# Patient Record
Sex: MALE | Race: ASIAN | NOT HISPANIC OR LATINO | URBAN - METROPOLITAN AREA
[De-identification: names, ages, dates, MRNs, and addresses within clinical notes are randomized per-mention and may not be internally consistent; named-entity substitution may affect disease eponyms.]

---

## 2018-08-11 ENCOUNTER — EMERGENCY (EMERGENCY)
Facility: HOSPITAL | Age: 58
LOS: 1 days | Discharge: HOME | End: 2018-08-11
Attending: EMERGENCY MEDICINE | Admitting: EMERGENCY MEDICINE
Payer: COMMERCIAL

## 2018-08-11 VITALS
DIASTOLIC BLOOD PRESSURE: 79 MMHG | RESPIRATION RATE: 18 BRPM | HEART RATE: 81 BPM | OXYGEN SATURATION: 98 % | TEMPERATURE: 98 F | SYSTOLIC BLOOD PRESSURE: 156 MMHG

## 2018-08-11 LAB
ALBUMIN SERPL ELPH-MCNC: 4.2 G/DL — SIGNIFICANT CHANGE UP (ref 3.3–5)
ALP SERPL-CCNC: 78 U/L — SIGNIFICANT CHANGE UP (ref 40–120)
ALT FLD-CCNC: 28 U/L — SIGNIFICANT CHANGE UP (ref 4–41)
AST SERPL-CCNC: 21 U/L — SIGNIFICANT CHANGE UP (ref 4–40)
BASE EXCESS BLDV CALC-SCNC: -0.8 MMOL/L — SIGNIFICANT CHANGE UP
BASOPHILS # BLD AUTO: 0.1 K/UL — SIGNIFICANT CHANGE UP (ref 0–0.2)
BASOPHILS NFR BLD AUTO: 1.2 % — SIGNIFICANT CHANGE UP (ref 0–2)
BILIRUB SERPL-MCNC: 0.2 MG/DL — SIGNIFICANT CHANGE UP (ref 0.2–1.2)
BLOOD GAS VENOUS - CREATININE: 1.01 MG/DL — SIGNIFICANT CHANGE UP (ref 0.5–1.3)
BUN SERPL-MCNC: 22 MG/DL — SIGNIFICANT CHANGE UP (ref 7–23)
CALCIUM SERPL-MCNC: 9.5 MG/DL — SIGNIFICANT CHANGE UP (ref 8.4–10.5)
CHLORIDE BLDV-SCNC: 112 MMOL/L — HIGH (ref 96–108)
CHLORIDE SERPL-SCNC: 104 MMOL/L — SIGNIFICANT CHANGE UP (ref 98–107)
CO2 SERPL-SCNC: 20 MMOL/L — LOW (ref 22–31)
CREAT SERPL-MCNC: 1.07 MG/DL — SIGNIFICANT CHANGE UP (ref 0.5–1.3)
EOSINOPHIL # BLD AUTO: 0.35 K/UL — SIGNIFICANT CHANGE UP (ref 0–0.5)
EOSINOPHIL NFR BLD AUTO: 4.2 % — SIGNIFICANT CHANGE UP (ref 0–6)
GAS PNL BLDV: 138 MMOL/L — SIGNIFICANT CHANGE UP (ref 136–146)
GLUCOSE BLDV-MCNC: 101 — HIGH (ref 70–99)
GLUCOSE SERPL-MCNC: 102 MG/DL — HIGH (ref 70–99)
HCO3 BLDV-SCNC: 23 MMOL/L — SIGNIFICANT CHANGE UP (ref 20–27)
HCT VFR BLD CALC: 43.7 % — SIGNIFICANT CHANGE UP (ref 39–50)
HCT VFR BLDV CALC: 46.8 % — SIGNIFICANT CHANGE UP (ref 39–51)
HGB BLD-MCNC: 14.8 G/DL — SIGNIFICANT CHANGE UP (ref 13–17)
HGB BLDV-MCNC: 15.3 G/DL — SIGNIFICANT CHANGE UP (ref 13–17)
IMM GRANULOCYTES # BLD AUTO: 0.03 # — SIGNIFICANT CHANGE UP
IMM GRANULOCYTES NFR BLD AUTO: 0.4 % — SIGNIFICANT CHANGE UP (ref 0–1.5)
LACTATE BLDV-MCNC: 1.2 MMOL/L — SIGNIFICANT CHANGE UP (ref 0.5–2)
LYMPHOCYTES # BLD AUTO: 2.9 K/UL — SIGNIFICANT CHANGE UP (ref 1–3.3)
LYMPHOCYTES # BLD AUTO: 35.1 % — SIGNIFICANT CHANGE UP (ref 13–44)
MCHC RBC-ENTMCNC: 29.4 PG — SIGNIFICANT CHANGE UP (ref 27–34)
MCHC RBC-ENTMCNC: 33.9 % — SIGNIFICANT CHANGE UP (ref 32–36)
MCV RBC AUTO: 86.9 FL — SIGNIFICANT CHANGE UP (ref 80–100)
MONOCYTES # BLD AUTO: 0.65 K/UL — SIGNIFICANT CHANGE UP (ref 0–0.9)
MONOCYTES NFR BLD AUTO: 7.9 % — SIGNIFICANT CHANGE UP (ref 2–14)
NEUTROPHILS # BLD AUTO: 4.23 K/UL — SIGNIFICANT CHANGE UP (ref 1.8–7.4)
NEUTROPHILS NFR BLD AUTO: 51.2 % — SIGNIFICANT CHANGE UP (ref 43–77)
NRBC # FLD: 0 — SIGNIFICANT CHANGE UP
PCO2 BLDV: 44 MMHG — SIGNIFICANT CHANGE UP (ref 41–51)
PH BLDV: 7.36 PH — SIGNIFICANT CHANGE UP (ref 7.32–7.43)
PLATELET # BLD AUTO: 251 K/UL — SIGNIFICANT CHANGE UP (ref 150–400)
PMV BLD: 9.7 FL — SIGNIFICANT CHANGE UP (ref 7–13)
PO2 BLDV: 62 MMHG — HIGH (ref 35–40)
POTASSIUM BLDV-SCNC: 4.1 MMOL/L — SIGNIFICANT CHANGE UP (ref 3.4–4.5)
POTASSIUM SERPL-MCNC: 4.3 MMOL/L — SIGNIFICANT CHANGE UP (ref 3.5–5.3)
POTASSIUM SERPL-SCNC: 4.3 MMOL/L — SIGNIFICANT CHANGE UP (ref 3.5–5.3)
PROT SERPL-MCNC: 8 G/DL — SIGNIFICANT CHANGE UP (ref 6–8.3)
RBC # BLD: 5.03 M/UL — SIGNIFICANT CHANGE UP (ref 4.2–5.8)
RBC # FLD: 12.6 % — SIGNIFICANT CHANGE UP (ref 10.3–14.5)
SAO2 % BLDV: 90.7 % — HIGH (ref 60–85)
SODIUM SERPL-SCNC: 139 MMOL/L — SIGNIFICANT CHANGE UP (ref 135–145)
WBC # BLD: 8.26 K/UL — SIGNIFICANT CHANGE UP (ref 3.8–10.5)
WBC # FLD AUTO: 8.26 K/UL — SIGNIFICANT CHANGE UP (ref 3.8–10.5)

## 2018-08-11 PROCEDURE — 99219: CPT

## 2018-08-11 PROCEDURE — 73140 X-RAY EXAM OF FINGER(S): CPT | Mod: 26,RT

## 2018-08-11 RX ORDER — LISINOPRIL 2.5 MG/1
20 TABLET ORAL DAILY
Qty: 0 | Refills: 0 | Status: DISCONTINUED | OUTPATIENT
Start: 2018-08-11 | End: 2018-08-15

## 2018-08-11 RX ORDER — KETOROLAC TROMETHAMINE 30 MG/ML
30 SYRINGE (ML) INJECTION ONCE
Qty: 0 | Refills: 0 | Status: DISCONTINUED | OUTPATIENT
Start: 2018-08-11 | End: 2018-08-11

## 2018-08-11 RX ORDER — LISINOPRIL 2.5 MG/1
10 TABLET ORAL ONCE
Qty: 0 | Refills: 0 | Status: COMPLETED | OUTPATIENT
Start: 2018-08-11 | End: 2018-08-11

## 2018-08-11 RX ADMIN — Medication 600 MILLIGRAM(S): at 16:00

## 2018-08-11 RX ADMIN — LISINOPRIL 10 MILLIGRAM(S): 2.5 TABLET ORAL at 21:21

## 2018-08-11 RX ADMIN — Medication 30 MILLIGRAM(S): at 21:21

## 2018-08-11 RX ADMIN — Medication 30 MILLIGRAM(S): at 22:00

## 2018-08-11 RX ADMIN — Medication 100 MILLIGRAM(S): at 22:54

## 2018-08-11 RX ADMIN — Medication 100 MILLIGRAM(S): at 15:30

## 2018-08-11 NOTE — ED CDU PROVIDER INITIAL DAY NOTE - OBJECTIVE STATEMENT
57 y.o male pmhx of HTN here with swelling, pain and discoloration to R 3rd digit s/p laceration repair 3 weeks ago. Pt injured his finger while at work when one of the lift ramps dropped on his hand- laceration requiring 7 stiches and also had acute tuft fracture. Pt had sutures removed 7/5- went to workers comp doctor yesterday who started him on augmentin and said it was infected. In the ED,  xray without osteo- hand was called for consult. Sent to CDU for IX abx and hand.

## 2018-08-11 NOTE — ED CDU PROVIDER INITIAL DAY NOTE - ATTENDING CONTRIBUTION TO CARE
Pt was seen and evaluated by me. Pt is a 56 y/o male with PMHx of HTN presenting to the ED for worsening swelling and pain to right 3rd finger. Pt states 3 wks ago he got injured at work when a lift ramp dropped on his left hand. Pt was seen at [place] and noted to have a comminuted tuft fracture to the right 3rd finger with a laceration that was sutured. Pt had sutures removed at [place] on 7/5 and followed up with his worker's comp physician who started pt on Augmentin and sent pt in for concern for infection. Pt admits to pain to the tip of the right 3rd finger with difficulty bending it. Pt denies any fever, chills, nausea, vomiting, SOB, chest pain, or abd pain. Pt was seen in the ED and started on IV clinda. Pt was sent to OBS to continue IV antibiotics and Hand consult. Lungs CTA b/l. RRR. Abd soft, non-tender. Noted to have swelling with ecchymosis to tip of right 3rd finger with difficulty in flexing. + distal pulses.

## 2018-08-11 NOTE — ED PROVIDER NOTE - PHYSICAL EXAMINATION
Rt. middle finger, open wound, infected with discharge. Rt. middle finger wound, infected with discharge.

## 2018-08-11 NOTE — PROCEDURE NOTE - NSEQUIPUSED_SKIN_A_CORE
gloves/antiseptic cleaning solution/gauze pads/normal saline solution/povidone-iodine pads/forceps/adhesive tape

## 2018-08-11 NOTE — ED PROVIDER NOTE - MEDICAL DECISION MAKING DETAILS
57M presenting with rt. finger infection s/p lac repaired 6 weeks ago, started on Doxy yesterday. Endorsed to pain and discharge, no fever, no loss of sensation - can range. Will get Xray to r/o osteo, Hand f/u and Abx (Augmentin vs. IV) and reassess.

## 2018-08-11 NOTE — ED ADULT NURSE NOTE - OBJECTIVE STATEMENT
Pt received a&ox3, c/o pain/swelling to third digit on right hand s/p lac 1.5 months ago, pt denies fevers/chills, appears comfortable, ambulatory, NAD, MD eval performed, 22 gauge IV placed in left hand, labs drawn and sent.

## 2018-08-11 NOTE — ED CDU PROVIDER INITIAL DAY NOTE - RESPIRATORY NEGATIVE STATEMENT, MLM
Unclear exact etiology.  Given persistence will refer to general surgery to consult for removal.  Tetanus updated today, let me know if you change your mind about flu vaccine.  Electronically Signed By: Annemarie Mcduffie PA-C     no chest pain, no cough, and no shortness of breath.

## 2018-08-11 NOTE — CONSULT NOTE ADULT - SUBJECTIVE AND OBJECTIVE BOX
58 yo RHD M 6 weeks s/p crush to right middle finger with tuft fracture and laceration.  Seen and treated at OSH.  Seen by private doctor yesterday and diagnosed with "infection".  Started on doxycyline.  Here today for pain.    PMH/PSH: reviewed  NKDA  Meds: doxycyline  Soc: RHD, works at [employer], nonsmoker  Fam: noncontrib    ROS: as per HPI    PE:  NAD  Alert  MMM  PERRL  Right long finger with mild swelling at distal aspect  Tender to palpation at distal tuft  Very stiff at DIPJ  Finger very dirty with dried blood, dead skin, subungal old blood  Appears neglected    xray: nonunion of middle finger tuft fracture    A/P: 58 yo RHD M 6 weeks after RMF crush.    diagnostic I&D performed - no purulence found.  see procedure note.  Finger aggressively cleaned  Recommend hand therapy for stiffness  Follow up 1 week for wound check    Tia Valencia MD  Plastic Surgery

## 2018-08-11 NOTE — ED CDU PROVIDER INITIAL DAY NOTE - PHYSICAL EXAMINATION
R 3rd digit with distal tip swelling/discoloration and tenderness to palpation, no crepitus   foul smelling  no active drainage  pulses intact, compartments soft, no streaking up arm

## 2018-08-11 NOTE — ED ADULT NURSE NOTE - CHIEF COMPLAINT QUOTE
Laceration to right hand 3rd finger 6/2/18.  [facility], f/u with mayi and saw hand specialist yesterday.  As per pt hand specialist opened wound and instructed pt to come to ED, secondary to infection.

## 2018-08-11 NOTE — ED PROVIDER NOTE - OBJECTIVE STATEMENT
57M presenting with finger infection s/p lac June 24th. Pt was sutured for rt. middle finger lact at [facility] and was told that it was healing well. Pt was seen by a worker [facility] doctor yesterday and was told that it has an infection. Pt was started on Doxy yesterday.

## 2018-08-11 NOTE — ED ADULT TRIAGE NOTE - CHIEF COMPLAINT QUOTE
Laceration to right hand 3rd finger 6/2/18.  [place] [facility], f/u with mayi and saw hand specialist yesterday.  As per pt hand specialist opened wound and instructed pt to come to ED, secondary to infection.

## 2018-08-11 NOTE — ED ADULT NURSE NOTE - NSIMPLEMENTINTERV_GEN_ALL_ED
Implemented All Universal Safety Interventions:  [place] to call system. Call bell, personal items and telephone within reach. Instruct patient to call for assistance. Room bathroom lighting operational. Non-slip footwear when patient is off stretcher. Physically safe environment: no spills, clutter or unnecessary equipment. Stretcher in lowest position, wheels locked, appropriate side rails in place.

## 2018-08-12 VITALS
RESPIRATION RATE: 16 BRPM | HEART RATE: 66 BPM | DIASTOLIC BLOOD PRESSURE: 63 MMHG | TEMPERATURE: 98 F | SYSTOLIC BLOOD PRESSURE: 126 MMHG | OXYGEN SATURATION: 100 %

## 2018-08-12 PROCEDURE — 99217: CPT

## 2018-08-12 RX ADMIN — Medication 600 MILLIGRAM(S): at 07:43

## 2018-08-12 RX ADMIN — Medication 100 MILLIGRAM(S): at 06:08

## 2018-08-12 NOTE — ED CDU PROVIDER SUBSEQUENT DAY NOTE - PROGRESS NOTE DETAILS
pt feeling well this morning, pain much improved. will dc with clinda. To f.u in 1 week w Dr. Laboy. Return precautions given.

## 2018-08-12 NOTE — ED CDU PROVIDER SUBSEQUENT DAY NOTE - HISTORY
57 y.o male pmhx of HTN here with swelling, pain and discoloration to R 3rd digit s/p laceration repair 3 weeks ago. Pt injured his finger while at work when one of the lift ramps dropped on his hand- laceration requiring 7 stiches and also had acute tuft fracture. Pt had sutures removed 7/5- went to workers comp doctor yesterday who started him on augmentin and said it was infected. In the ED,  xray without osteo- hand was called for consult. Sent to CDU for IX abx and hand.  MIKE Dia; Agree with above, HTN here with swelling, pain and discoloration to R 3rd digit s/p laceration repair 3 weeks ago. Pt injured his finger while at work when one of the lift ramps dropped on his hand- laceration requiring 7 stiches and also had acute tuft fracture. Pt had sutures removed 7/5. Pt started on Augmentin by workers comp physician feeling hand was infected. Pt seen by hand who attempted drainage which was mostly non-productive and a wound culture was sent. Hand sx feels likely inflammatory changes but area is inflamed, open and with a prior fx and pt reports pain, will continue with abx. X ryas neg for osteo, pt stable at this time. 57 y.o male pmhx of HTN here with swelling, pain and discoloration to R 3rd digit s/p laceration repair 3 weeks ago. Pt injured his finger while at work when one of the lift ramps dropped on his hand- laceration requiring 7 stiches and also had acute tuft fracture. Pt had sutures removed 7/5- went to workers comp doctor yesterday who started him on augmentin and said it was infected. In the ED,  xray without osteo- hand was called for consult. Sent to CDU for IX abx and hand.    CDU MIKE Dia; Agree with above, HTN here with swelling, pain and discoloration to R 3rd digit s/p laceration repair 3 weeks ago. Pt injured his finger while at work when one of the lift ramps dropped on his hand- laceration requiring 7 stiches and also had acute tuft fracture. Pt had sutures removed 7/5. Pt started on Augmentin by workers comp physician feeling hand was infected. Pt seen by hand who attempted drainage which was mostly non-productive and a wound culture was sent. Hand sx feels likely inflammatory changes but area is inflamed, open and with a prior fx and pt reports pain, will continue with abx. X ryas neg for osteo, pt stable at this time.

## 2018-08-12 NOTE — ED CDU PROVIDER SUBSEQUENT DAY NOTE - MEDICAL DECISION MAKING DETAILS
57 y.o male pmhx of HTN here with swelling, pain and discoloration to R 3rd digit s/p laceration repair 3 weeks ago. Pt sent to cdu for hand evaluation. Plan is continued abx and reassessment, pt is stable and denies systemic symptoms such as fever, chills, nightsweats.

## 2018-08-12 NOTE — ED CDU PROVIDER SUBSEQUENT DAY NOTE - ATTENDING CONTRIBUTION TO CARE
Julia DENG- 58 Y/O M with h/o htn p/w discoloration of wound over the right third finger pulp space with 6 wks old fx and was placed in cdu for iv antibiotic after hand consult, pt feels better and has no fever, chills. Pt has decrease range of motion at rt 3rd finger PIP and DIP but normal rom at mcp .Pt is rt handed and this was work related injury    pt is alert, well appearing male, s1s2 normal reg,  b/l clear breath sounds, abd soft, nt nd, normal bowel sounds, neuro exam aox3  cn 2-12 intact, no focal defictis, good pulses in all 4 ext, rt hand 3rd finger wrapped in gauze with dry skin changes around 3rd finger and 2nd and 4th finger but no redness, no drainage, Pt able to move all fingers at mcp , unable to flex at rt 3rd pip and dip     plan to send home with outpatient follow up, given strict wound instructions, and continue po antibiotics

## 2018-08-12 NOTE — ED CDU PROVIDER DISPOSITION NOTE - ATTENDING CONTRIBUTION TO CARE
Julia DENG- 58 Y/O M with h/o htn p/w discoloration of wound over the right third finger pulp space with 6 wks old fx and was placed in cdu for iv antibiotic after hand consult, pt feels better and has no fever, chills. Pt has decrease range of motion at rt 3rd finger PIP and DIP but normal rom at mcp .Pt is rt handed and this was work related injury    pt is alert, well appearing male, s1s2 normal reg,  b/l clear breath sounds, abd soft, nt nd, normal bowel sounds, neuro exam aox3  cn 2-12 intact, no focal defictis, good pulses in all 4 ext, rt hand 3rd finger wrapped in gauze with dry skin changes around 3rd finger and 2nd and 4th finger but no redness, no drainage, Pt able to move all fingers at mcp , unable to flex at rt 3rd pip and dip     plan to send home with outpatient follow up, given strict wound instructions, and continue po antibiotics.

## 2018-08-13 LAB — SPECIMEN SOURCE: SIGNIFICANT CHANGE UP

## 2018-08-14 LAB — BACTERIA WND CULT: SIGNIFICANT CHANGE UP

## 2018-08-16 NOTE — ED POST DISCHARGE NOTE - DETAILS
called [phone], [phone] left message for pt to call back . Called [phone] person that answered phone states wrong # Plan : Admin team to call pt again and check on status

## 2018-11-29 ENCOUNTER — INPATIENT (INPATIENT)
Facility: HOSPITAL | Age: 58
LOS: 1 days | Discharge: HOME | DRG: 313 | End: 2018-12-01
Attending: INTERNAL MEDICINE | Admitting: INTERNAL MEDICINE
Payer: COMMERCIAL

## 2018-11-29 VITALS
HEART RATE: 107 BPM | RESPIRATION RATE: 16 BRPM | SYSTOLIC BLOOD PRESSURE: 192 MMHG | OXYGEN SATURATION: 98 % | TEMPERATURE: 98 F | DIASTOLIC BLOOD PRESSURE: 92 MMHG

## 2018-11-29 DIAGNOSIS — R07.9 CHEST PAIN, UNSPECIFIED: ICD-10-CM

## 2018-11-29 DIAGNOSIS — I10 ESSENTIAL (PRIMARY) HYPERTENSION: ICD-10-CM

## 2018-11-29 DIAGNOSIS — G47.30 SLEEP APNEA, UNSPECIFIED: ICD-10-CM

## 2018-11-29 DIAGNOSIS — E78.5 HYPERLIPIDEMIA, UNSPECIFIED: ICD-10-CM

## 2018-11-29 DIAGNOSIS — Z29.9 ENCOUNTER FOR PROPHYLACTIC MEASURES, UNSPECIFIED: ICD-10-CM

## 2018-11-29 DIAGNOSIS — I24.9 ACUTE ISCHEMIC HEART DISEASE, UNSPECIFIED: ICD-10-CM

## 2018-11-29 LAB
ALBUMIN SERPL ELPH-MCNC: 3.4 G/DL — LOW (ref 3.5–5)
ALP SERPL-CCNC: 88 U/L — SIGNIFICANT CHANGE UP (ref 40–120)
ALT FLD-CCNC: 32 U/L DA — SIGNIFICANT CHANGE UP (ref 10–60)
ANION GAP SERPL CALC-SCNC: 7 MMOL/L — SIGNIFICANT CHANGE UP (ref 5–17)
APTT BLD: 33.1 SEC — SIGNIFICANT CHANGE UP (ref 27.5–36.3)
AST SERPL-CCNC: 23 U/L — SIGNIFICANT CHANGE UP (ref 10–40)
BILIRUB SERPL-MCNC: 0.3 MG/DL — SIGNIFICANT CHANGE UP (ref 0.2–1.2)
BUN SERPL-MCNC: 22 MG/DL — HIGH (ref 7–18)
CALCIUM SERPL-MCNC: 9.3 MG/DL — SIGNIFICANT CHANGE UP (ref 8.4–10.5)
CHLORIDE SERPL-SCNC: 107 MMOL/L — SIGNIFICANT CHANGE UP (ref 96–108)
CHOLEST SERPL-MCNC: 168 MG/DL — SIGNIFICANT CHANGE UP (ref 10–199)
CK MB BLD-MCNC: 1.9 % — SIGNIFICANT CHANGE UP (ref 0–3.5)
CK MB CFR SERPL CALC: 1.7 NG/ML — SIGNIFICANT CHANGE UP (ref 0–3.6)
CK SERPL-CCNC: 89 U/L — SIGNIFICANT CHANGE UP (ref 35–232)
CO2 SERPL-SCNC: 25 MMOL/L — SIGNIFICANT CHANGE UP (ref 22–31)
CREAT SERPL-MCNC: 1.18 MG/DL — SIGNIFICANT CHANGE UP (ref 0.5–1.3)
GLUCOSE SERPL-MCNC: 127 MG/DL — HIGH (ref 70–99)
HCT VFR BLD CALC: 44.5 % — SIGNIFICANT CHANGE UP (ref 39–50)
HDLC SERPL-MCNC: 44 MG/DL — SIGNIFICANT CHANGE UP
HGB BLD-MCNC: 14.8 G/DL — SIGNIFICANT CHANGE UP (ref 13–17)
INR BLD: 0.92 RATIO — SIGNIFICANT CHANGE UP (ref 0.88–1.16)
LIPID PNL WITH DIRECT LDL SERPL: 62 MG/DL — SIGNIFICANT CHANGE UP
MCHC RBC-ENTMCNC: 29.7 PG — SIGNIFICANT CHANGE UP (ref 27–34)
MCHC RBC-ENTMCNC: 33.3 GM/DL — SIGNIFICANT CHANGE UP (ref 32–36)
MCV RBC AUTO: 89.2 FL — SIGNIFICANT CHANGE UP (ref 80–100)
NT-PROBNP SERPL-SCNC: 28 PG/ML — SIGNIFICANT CHANGE UP (ref 0–125)
PLATELET # BLD AUTO: 262 K/UL — SIGNIFICANT CHANGE UP (ref 150–400)
POTASSIUM SERPL-MCNC: 4.6 MMOL/L — SIGNIFICANT CHANGE UP (ref 3.5–5.3)
POTASSIUM SERPL-SCNC: 4.6 MMOL/L — SIGNIFICANT CHANGE UP (ref 3.5–5.3)
PROT SERPL-MCNC: 8.2 G/DL — SIGNIFICANT CHANGE UP (ref 6–8.3)
PROTHROM AB SERPL-ACNC: 10.2 SEC — SIGNIFICANT CHANGE UP (ref 10–12.9)
RBC # BLD: 4.99 M/UL — SIGNIFICANT CHANGE UP (ref 4.2–5.8)
RBC # FLD: 11.4 % — SIGNIFICANT CHANGE UP (ref 10.3–14.5)
SODIUM SERPL-SCNC: 139 MMOL/L — SIGNIFICANT CHANGE UP (ref 135–145)
TOTAL CHOLESTEROL/HDL RATIO MEASUREMENT: 3.8 RATIO — SIGNIFICANT CHANGE UP (ref 3.4–9.6)
TRIGL SERPL-MCNC: 312 MG/DL — HIGH (ref 10–149)
TROPONIN I SERPL-MCNC: <0.015 NG/ML — SIGNIFICANT CHANGE UP (ref 0–0.04)
TROPONIN I SERPL-MCNC: <0.015 NG/ML — SIGNIFICANT CHANGE UP (ref 0–0.04)
WBC # BLD: 8.2 K/UL — SIGNIFICANT CHANGE UP (ref 3.8–10.5)
WBC # FLD AUTO: 8.2 K/UL — SIGNIFICANT CHANGE UP (ref 3.8–10.5)

## 2018-11-29 PROCEDURE — 71046 X-RAY EXAM CHEST 2 VIEWS: CPT | Mod: 26

## 2018-11-29 PROCEDURE — 99285 EMERGENCY DEPT VISIT HI MDM: CPT

## 2018-11-29 RX ORDER — SIMVASTATIN 20 MG/1
10 TABLET, FILM COATED ORAL AT BEDTIME
Qty: 0 | Refills: 0 | Status: DISCONTINUED | OUTPATIENT
Start: 2018-11-29 | End: 2018-12-01

## 2018-11-29 RX ORDER — LABETALOL HCL 100 MG
10 TABLET ORAL ONCE
Qty: 0 | Refills: 0 | Status: COMPLETED | OUTPATIENT
Start: 2018-11-29 | End: 2018-11-29

## 2018-11-29 RX ORDER — HEPARIN SODIUM 5000 [USP'U]/ML
5000 INJECTION INTRAVENOUS; SUBCUTANEOUS EVERY 12 HOURS
Qty: 0 | Refills: 0 | Status: DISCONTINUED | OUTPATIENT
Start: 2018-11-29 | End: 2018-11-29

## 2018-11-29 RX ORDER — ACETAMINOPHEN 500 MG
650 TABLET ORAL EVERY 6 HOURS
Qty: 0 | Refills: 0 | Status: DISCONTINUED | OUTPATIENT
Start: 2018-11-29 | End: 2018-12-01

## 2018-11-29 RX ORDER — ASPIRIN/CALCIUM CARB/MAGNESIUM 324 MG
325 TABLET ORAL ONCE
Qty: 0 | Refills: 0 | Status: COMPLETED | OUTPATIENT
Start: 2018-11-29 | End: 2018-11-29

## 2018-11-29 RX ORDER — METOPROLOL TARTRATE 50 MG
25 TABLET ORAL DAILY
Qty: 0 | Refills: 0 | Status: DISCONTINUED | OUTPATIENT
Start: 2018-11-29 | End: 2018-12-01

## 2018-11-29 RX ORDER — LOSARTAN POTASSIUM 100 MG/1
100 TABLET, FILM COATED ORAL DAILY
Qty: 0 | Refills: 0 | Status: DISCONTINUED | OUTPATIENT
Start: 2018-11-29 | End: 2018-12-01

## 2018-11-29 RX ORDER — LOSARTAN POTASSIUM 100 MG/1
50 TABLET, FILM COATED ORAL
Qty: 0 | Refills: 0 | Status: DISCONTINUED | OUTPATIENT
Start: 2018-11-29 | End: 2018-12-01

## 2018-11-29 RX ORDER — DILTIAZEM HCL 120 MG
360 CAPSULE, EXT RELEASE 24 HR ORAL
Qty: 0 | Refills: 0 | Status: DISCONTINUED | OUTPATIENT
Start: 2018-11-29 | End: 2018-12-01

## 2018-11-29 RX ORDER — ENOXAPARIN SODIUM 100 MG/ML
40 INJECTION SUBCUTANEOUS DAILY
Qty: 0 | Refills: 0 | Status: DISCONTINUED | OUTPATIENT
Start: 2018-11-29 | End: 2018-12-01

## 2018-11-29 RX ORDER — ASPIRIN/CALCIUM CARB/MAGNESIUM 324 MG
81 TABLET ORAL DAILY
Qty: 0 | Refills: 0 | Status: DISCONTINUED | OUTPATIENT
Start: 2018-11-29 | End: 2018-12-01

## 2018-11-29 RX ORDER — ACETAMINOPHEN 500 MG
1000 TABLET ORAL ONCE
Qty: 0 | Refills: 0 | Status: DISCONTINUED | OUTPATIENT
Start: 2018-11-29 | End: 2018-11-29

## 2018-11-29 RX ADMIN — SIMVASTATIN 10 MILLIGRAM(S): 20 TABLET, FILM COATED ORAL at 22:53

## 2018-11-29 RX ADMIN — LOSARTAN POTASSIUM 50 MILLIGRAM(S): 100 TABLET, FILM COATED ORAL at 19:52

## 2018-11-29 RX ADMIN — Medication 10 MILLIGRAM(S): at 16:36

## 2018-11-29 RX ADMIN — Medication 325 MILLIGRAM(S): at 16:35

## 2018-11-29 NOTE — ED ADULT NURSE NOTE - NSIMPLEMENTINTERV_GEN_ALL_ED
Implemented All Universal Safety Interventions:  [place] to call system. Call bell, personal items and telephone within reach. Instruct patient to call for assistance. Room bathroom lighting operational. Non-slip footwear when patient is off stretcher. Physically safe environment: no spills, clutter or unnecessary equipment. Stretcher in lowest position, wheels locked, appropriate side rails in place.

## 2018-11-29 NOTE — H&P ADULT - PROBLEM SELECTOR PLAN 5
[] Previous VTE                                                3  [] Thrombophilia                                             2  [] Lower limb paralysis                                   2    [] Current Cancer                                             2   [x] Immobilization > 24 hrs                              1  [] ICU/CCU stay > 24 hours                             1  [] Age > 60                                                         1    IMPROVE VTE Score: Lovenox sub q for DVT prophy

## 2018-11-29 NOTE — H&P ADULT - HISTORY OF PRESENT ILLNESS
Pt is a 58M, from home, w/ PMHx of HTN, HLD, Sleep Apnea (On CPAP) who presents with chest pressure starting today.  Pt states he was at rest when he began experiencing constant chest pressure today, 5/10 intensity, non radiating, not relieved with asa.  Pt denies CP, palpitations, HA, dizziness, blurred vision, diaphoresis, or syncope.  Pt states he took his BP and systolic was in the 180's.  Pt reports prior cardiac workup 4 years prior which was "normal."  Pt saw PCP, Dr. Armand Bradshaw, 3 weeks prior with no new events.  Pt denies fever, abd pain, cough, N/V/D, dizziness, HA, or weakness.       In the ED, pt presents in no acute distress, vitals WNL, and EKG NSR

## 2018-11-29 NOTE — ED PROVIDER NOTE - OBJECTIVE STATEMENT
h/o HTN, HLD p/w chest pressure x 2 hours pta. Also relates high BP today. Symptoms onset while cooking in kitchen. Relates 2 previous episodes in past 3 months did not seek attention then. Took his BP med today. Last evaluate for chest pain 4 years ago at [facility]. Also has FH CAD in brother.

## 2018-11-29 NOTE — ED PROVIDER NOTE - MEDICAL DECISION MAKING DETAILS
h/o HTN, HLD, FH CAD with chest pain, elevated BP today, EKG t wave inversions I, AVL with ST depressions, will do labs, IV labetalol and reassess

## 2018-11-29 NOTE — H&P ADULT - ASSESSMENT
Pt is a 58M, from home, w/ PMHx of HTN, HLD, Sleep Apnea (On CPAP) who presents with chest pressure starting today.    In the ED, pt presents in no acute distress, vitals WNL, and EKG NSR.  Pt remains afebrile w/o leukocytosis.  Remaining labs WNL.  Trop 1 neg.  CXR clear.    Pt will be admitted to [facility] for ACS to r/o NSTEMI Pt is a 58M, from home, w/ PMHx of HTN, HLD, Sleep Apnea (On CPAP) who presents with chest pressure starting today.  In the ED, pt presents in no acute distress, vitals WNL, and EKG NSR.  Pt remains afebrile w/o leukocytosis.  Remaining labs WNL.  Trop 1 neg.  CXR clear.    Pt will be admitted to [facility] for ACS to r/o NSTEMI

## 2018-11-29 NOTE — H&P ADULT - RS GEN PE MLT RESP DETAILS PC
breath sounds equal/no rhonchi/no wheezes/respirations non-labored/no rales/good air movement/airway patent

## 2018-11-29 NOTE — ED ADULT NURSE NOTE - ED STAT RN HANDOFF DETAILS
Handover report received from Nurse Escobedo. Patient is being nursed on Tele Box-SR noted. Patient ambulating

## 2018-11-29 NOTE — H&P ADULT - NSHPPHYSICALEXAM_GEN_ALL_CORE
Vital Signs Last 24 Hrs  T(C): 36.7 (29 Nov 2018 16:56), Max: 36.9 (29 Nov 2018 11:52)  T(F): 98.1 (29 Nov 2018 16:56), Max: 98.5 (29 Nov 2018 11:52)  HR: 73 (29 Nov 2018 16:56) (73 - 107)  BP: 174/90 (29 Nov 2018 16:56) (174/73 - 192/92)  RR: 18 (29 Nov 2018 16:56) (16 - 18)  SpO2: 99% (29 Nov 2018 16:56) (98% - 99%)

## 2018-11-29 NOTE — H&P ADULT - PROBLEM SELECTOR PLAN 1
-Pt reports Chest pressure today lasting 1 hr  -EKG NSR  -Trop 1 neg; f/u T2 stat, and T3 at midnight   -c/w asa, statin, b-blocker   -Tele monitoring   -Serial EKG's  -Cardio: Dr. Yarbrough -Pt reports Chest pressure today lasting 1 hr  -EKG NSR  -Trop 1 neg; f/u T2 stat, and T3 at midnight   -c/w asa, statin, b-blocker   -Tele monitoring   -Serial EKG's  -f/u Echo  -Cardio: Dr. Garcia

## 2018-11-30 DIAGNOSIS — R73.03 PREDIABETES: ICD-10-CM

## 2018-11-30 DIAGNOSIS — R07.9 CHEST PAIN, UNSPECIFIED: ICD-10-CM

## 2018-11-30 DIAGNOSIS — H93.19 TINNITUS, UNSPECIFIED EAR: ICD-10-CM

## 2018-11-30 LAB
ANION GAP SERPL CALC-SCNC: 7 MMOL/L — SIGNIFICANT CHANGE UP (ref 5–17)
BUN SERPL-MCNC: 17 MG/DL — SIGNIFICANT CHANGE UP (ref 7–18)
CALCIUM SERPL-MCNC: 9 MG/DL — SIGNIFICANT CHANGE UP (ref 8.4–10.5)
CHLORIDE SERPL-SCNC: 108 MMOL/L — SIGNIFICANT CHANGE UP (ref 96–108)
CHOLEST SERPL-MCNC: 171 MG/DL — SIGNIFICANT CHANGE UP (ref 10–199)
CK MB BLD-MCNC: 2.4 % — SIGNIFICANT CHANGE UP (ref 0–3.5)
CK MB CFR SERPL CALC: 1.9 NG/ML — SIGNIFICANT CHANGE UP (ref 0–3.6)
CK SERPL-CCNC: 78 U/L — SIGNIFICANT CHANGE UP (ref 35–232)
CO2 SERPL-SCNC: 25 MMOL/L — SIGNIFICANT CHANGE UP (ref 22–31)
CREAT SERPL-MCNC: 0.92 MG/DL — SIGNIFICANT CHANGE UP (ref 0.5–1.3)
GLUCOSE SERPL-MCNC: 118 MG/DL — HIGH (ref 70–99)
HBA1C BLD-MCNC: 6.2 % — HIGH (ref 4–5.6)
HCT VFR BLD CALC: 45.1 % — SIGNIFICANT CHANGE UP (ref 39–50)
HDLC SERPL-MCNC: 42 MG/DL — SIGNIFICANT CHANGE UP
HGB BLD-MCNC: 14.6 G/DL — SIGNIFICANT CHANGE UP (ref 13–17)
LIPID PNL WITH DIRECT LDL SERPL: 96 MG/DL — SIGNIFICANT CHANGE UP
MAGNESIUM SERPL-MCNC: 2 MG/DL — SIGNIFICANT CHANGE UP (ref 1.6–2.6)
MCHC RBC-ENTMCNC: 29.9 PG — SIGNIFICANT CHANGE UP (ref 27–34)
MCHC RBC-ENTMCNC: 32.4 GM/DL — SIGNIFICANT CHANGE UP (ref 32–36)
MCV RBC AUTO: 92.2 FL — SIGNIFICANT CHANGE UP (ref 80–100)
PHOSPHATE SERPL-MCNC: 4.3 MG/DL — SIGNIFICANT CHANGE UP (ref 2.5–4.5)
PLATELET # BLD AUTO: 236 K/UL — SIGNIFICANT CHANGE UP (ref 150–400)
POTASSIUM SERPL-MCNC: 4.3 MMOL/L — SIGNIFICANT CHANGE UP (ref 3.5–5.3)
POTASSIUM SERPL-SCNC: 4.3 MMOL/L — SIGNIFICANT CHANGE UP (ref 3.5–5.3)
RBC # BLD: 4.89 M/UL — SIGNIFICANT CHANGE UP (ref 4.2–5.8)
RBC # FLD: 11.9 % — SIGNIFICANT CHANGE UP (ref 10.3–14.5)
SODIUM SERPL-SCNC: 140 MMOL/L — SIGNIFICANT CHANGE UP (ref 135–145)
TOTAL CHOLESTEROL/HDL RATIO MEASUREMENT: 4.1 RATIO — SIGNIFICANT CHANGE UP (ref 3.4–9.6)
TRIGL SERPL-MCNC: 167 MG/DL — HIGH (ref 10–149)
TROPONIN I SERPL-MCNC: <0.015 NG/ML — SIGNIFICANT CHANGE UP (ref 0–0.04)
TSH SERPL-MCNC: 1.63 UU/ML — SIGNIFICANT CHANGE UP (ref 0.34–4.82)
VIT B12 SERPL-MCNC: 659 PG/ML — SIGNIFICANT CHANGE UP (ref 232–1245)
WBC # BLD: 7.4 K/UL — SIGNIFICANT CHANGE UP (ref 3.8–10.5)
WBC # FLD AUTO: 7.4 K/UL — SIGNIFICANT CHANGE UP (ref 3.8–10.5)

## 2018-11-30 PROCEDURE — 78452 HT MUSCLE IMAGE SPECT MULT: CPT | Mod: 26

## 2018-11-30 PROCEDURE — 99223 1ST HOSP IP/OBS HIGH 75: CPT

## 2018-11-30 PROCEDURE — 93018 CV STRESS TEST I&R ONLY: CPT

## 2018-11-30 PROCEDURE — 93016 CV STRESS TEST SUPVJ ONLY: CPT

## 2018-11-30 RX ADMIN — Medication 25 MILLIGRAM(S): at 06:25

## 2018-11-30 RX ADMIN — LOSARTAN POTASSIUM 50 MILLIGRAM(S): 100 TABLET, FILM COATED ORAL at 17:52

## 2018-11-30 RX ADMIN — Medication 360 MILLIGRAM(S): at 15:28

## 2018-11-30 RX ADMIN — SIMVASTATIN 10 MILLIGRAM(S): 20 TABLET, FILM COATED ORAL at 21:36

## 2018-11-30 RX ADMIN — ENOXAPARIN SODIUM 40 MILLIGRAM(S): 100 INJECTION SUBCUTANEOUS at 11:44

## 2018-11-30 RX ADMIN — Medication 81 MILLIGRAM(S): at 11:44

## 2018-11-30 RX ADMIN — LOSARTAN POTASSIUM 100 MILLIGRAM(S): 100 TABLET, FILM COATED ORAL at 06:25

## 2018-11-30 NOTE — PROGRESS NOTE ADULT - PROBLEM SELECTOR PLAN 1
-Pt reports Chest pressure today lasting 1 hr  -EKG NSR  -Trop 1 neg; f/u T2 stat, and T3 at midnight   -c/w asa, statin, b-blocker   -Tele monitoring   -Serial EKG's  -f/u Echo  -Cardio: Dr. Garcia -Pt reports Chest pressure today lasting 1 hr  -EKG NSR  -Trop x 3 negative   -c/w asa, statin, b-blocker   -Tele monitoring   - ECHO showed Ejection Fraction 60 % , Grade 1 Diastolic Dysfunction  -Cardio: Dr. Garcia  Stress test -ve  CTA chest to rule ou PE as pt p/w palpitations

## 2018-11-30 NOTE — DISCHARGE NOTE ADULT - PATIENT PORTAL LINK FT
You can access the [facility] Patient Portal, offered by [facility], by registering with the following website: [email or web address]

## 2018-11-30 NOTE — DISCHARGE NOTE ADULT - MEDICATION SUMMARY - MEDICATIONS TO TAKE
I will START or STAY ON the medications listed below when I get home from the hospital:    losartan 100 mg oral tablet  -- 1 tab(s) by mouth once a day  -- Indication: For HTN (hypertension)    losartan 50 mg oral tablet  -- 1 tab(s) by mouth once a day  -- Indication: For HTN (hypertension)    dilTIAZem 360 mg/24 hours oral tablet, extended release  -- 1 tab(s) by mouth once a day  -- Indication: For HTN (hypertension)    simvastatin 10 mg oral tablet  -- 1 tab(s) by mouth once a day (at bedtime)  -- Indication: For HLD (hyperlipidemia)    metoprolol succinate 25 mg oral capsule, extended release  -- 1 cap(s) by mouth once a day  -- Indication: For HTN (hypertension)

## 2018-11-30 NOTE — DISCHARGE NOTE ADULT - PLAN OF CARE
no chest pain, Cardiology follow up for Event recorder You presented with chest pain. You were admitted to rule out ACS. EKG showed NSR, Trop x 3 negative.  ECHO showed Ejection Fraction 60 % , Grade 1 Diastolic Dysfunction, Stress test was normal.  CTA chest was done and it was negative for PE. You are recommended to follow up up with Dr Garcia in outpatient setting for Event recorder placement. You are also recommended to follow up with PCP in 3 days to get BMP tested to check for your Kidney function as you received Contrast  for imaging. You are also recommended to continue with your home regimen of medications You are recommended to take medications as advised and follow up with PCP You are recommended to continue with home regimen of HTN medication, exercise, take DASH diet  and follow up with PCP You CTA head and neck was normal. You are recommended to follow up with PCP. maintain normal lipid level. (LDL < 100)    Please take your medication Lipitor 40mg regularly   Please maintain healthy lifestyle by eating healthy as mentioned above, exercise regularly and maintain weight.   Please Follow up with your doctor in 1 week. .discharge

## 2018-11-30 NOTE — CONSULT NOTE ADULT - SUBJECTIVE AND OBJECTIVE BOX
TO BE COMPLETED    Neurology Consult    Patient is a 58y old  Male who presents with a chief complaint of Chest Pressure (30 Nov 2018 10:46)      HPI:  Pt is a 58M, from home, w/ PMHx of HTN, HLD, Sleep Apnea (On CPAP) who presents with chest pressure starting today.  Pt states he was at rest when he began experiencing constant chest pressure today, 5/10 intensity, non radiating, not relieved with asa.  Pt denies CP, palpitations, HA, dizziness, blurred vision, diaphoresis, or syncope.  Pt states he took his BP and systolic was in the 180's.  Pt reports prior cardiac workup 4 years prior which was "normal."  Pt saw PCP, Dr. Armand Bradshaw, 3 weeks prior with no new events.  Pt denies fever, abd pain, cough, N/V/D, dizziness, HA, or weakness.       In the ED, pt presents in no acute distress, vitals WNL, and EKG NSR (29 Nov 2018 19:09)      PAST MEDICAL & SURGICAL HISTORY:  Sleep apnea  HLD (hyperlipidemia)  HTN (hypertension)  No significant past surgical history      FAMILY HISTORY:  No pertinent family history in first degree relatives      Social History: (-) x 3    Allergies    No Known Allergies    Intolerances        MEDICATIONS  (STANDING):  aspirin enteric coated 81 milliGRAM(s) Oral daily  diltiazem    milliGRAM(s) Oral <User Schedule>  enoxaparin Injectable 40 milliGRAM(s) SubCutaneous daily  losartan 100 milliGRAM(s) Oral daily  losartan 50 milliGRAM(s) Oral <User Schedule>  metoprolol succinate ER 25 milliGRAM(s) Oral daily  simvastatin 10 milliGRAM(s) Oral at bedtime    MEDICATIONS  (PRN):  acetaminophen   Tablet .. 650 milliGRAM(s) Oral every 6 hours PRN Temp greater or equal to 38C (100.4F), Mild Pain (1 - 3)      Review of systems:    Constitutional: No fever, weight loss or fatigue    Eyes: No eye pain or discharge  ENMT:  No difficulty hearing; No sinus or throat pain  Neck: No pain or stiffness  Respiratory: No cough, wheezing, chills or hemoptysis  Cardiovascular: No chest pain, palpitations, shortness of breath, dyspnea on exertion  Gastrointestinal: No abdominal pain, nausea, vomiting or hematemesis; No diarrhea or constipation.   Genitourinary: No dysuria, frequency, hematuria or incontinence  Neurological: As per HPI  Skin: No rashes or lesions   Endocrine: No heat or cold intolerance; No hair loss  Musculoskeletal: No joint pain or swelling  Psychiatric: No depression, anxiety, mood swings  Heme/Lymph: No easy bruising or bleeding gums    Vital Signs Last 24 Hrs  T(C): 36.6 (30 Nov 2018 11:33), Max: 37.1 (29 Nov 2018 19:48)  T(F): 97.9 (30 Nov 2018 11:33), Max: 98.7 (29 Nov 2018 19:48)  HR: 83 (30 Nov 2018 11:33) (69 - 83)  BP: 145/81 (30 Nov 2018 11:33) (122/65 - 174/90)  BP(mean): --  RR: 18 (30 Nov 2018 11:33) (17 - 18)  SpO2: 100% (30 Nov 2018 11:33) (97% - 100%)    Neurologic Examination:  General:  Appearance is consistent with chronologic age.  No abnormal facies.   General: The patient is oriented to person, place, time and date.  Recent and remote memory intact.  Fund of knowledge is intact and normal.  Language with normal repetition, comprehension and naming.  Nondysarthric.    Cranial nerves: intact VA, VFF.  EOMI w/o nystagmus, skew or reported double vision.  PERRL.  No ptosis/weakness of eyelid closure.  Facial sensation is normal with normal bite.  No facial asymmetry.  Hearing grossly intact b/l.  Palate elevates midline.  Tongue midline.  Motor examination:   Normal tone, bulk and range of motion.  No tenderness, twitching, tremors or involuntary movements.  Formal Muscle Strength Testing: (MRC grade R/L) 5/5 UE; 5/5 LE.  No observable drift.  Reflexes:   2+ b/l pectoralis, biceps, triceps, brachioradialis, patella and Achilles.  Plantar response downgoing b/l.  Jaw jerk, Lloyd, clonus absent.  Sensory examination:   Intact to light touch and pinprick, pain, temperature and proprioception and vibration in all extremities.  Cerebellum:   FTN/HKS intact with normal KRISTA in all limbs.  No dysmetria or dysdiadokinesia.  Gait narrow based and normal.    Labs:   CBC Full  -  ( 30 Nov 2018 06:14 )  WBC Count : 7.4 K/uL  Hemoglobin : 14.6 g/dL  Hematocrit : 45.1 %  Platelet Count - Automated : 236 K/uL  Mean Cell Volume : 92.2 fl  Mean Cell Hemoglobin : 29.9 pg  Mean Cell Hemoglobin Concentration : 32.4 gm/dL  Auto Neutrophil # : x  Auto Lymphocyte # : x  Auto Monocyte # : x  Auto Eosinophil # : x  Auto Basophil # : x  Auto Neutrophil % : x  Auto Lymphocyte % : x  Auto Monocyte % : x  Auto Eosinophil % : x  Auto Basophil % : x    11-30    140  |  108  |  17  ----------------------------<  118<H>  4.3   |  25  |  0.92    Ca    9.0      30 Nov 2018 06:14  Phos  4.3     11-30  Mg     2.0     11-30    TPro  8.2  /  Alb  3.4<L>  /  TBili  0.3  /  DBili  x   /  AST  23  /  ALT  32  /  AlkPhos  88  11-29    LIVER FUNCTIONS - ( 29 Nov 2018 13:21 )  Alb: 3.4 g/dL / Pro: 8.2 g/dL / ALK PHOS: 88 U/L / ALT: 32 U/L DA / AST: 23 U/L / GGT: x           PT/INR - ( 29 Nov 2018 13:21 )   PT: 10.2 sec;   INR: 0.92 ratio         PTT - ( 29 Nov 2018 13:21 )  PTT:33.1 sec        Neuroimaging:  NCHCT:   CT Angiography/Perfusion:  MRI Brain NC:  MRA Head/Neck:  EEG:    Assessment:  This is a 58y Male with h/o     Plan:   -   11-30-18 @ 14:01          Please contact the Neurology consult service with any questions.    Ervin Bradshaw MD  Neurology Attending  [facility] Neurology Consult    Patient is a 58y old  Male who presents with a chief complaint of Chest Pressure (30 Nov 2018 10:46)    HPI:  This is a 58-year-old right-handed man with a history of HLD, TARUN on CPAP, and HTN, who initially presented with chest pain, and reports frequent episodes of sudden spikes in HTN. In 2000, he presented to [facility] when he had persistent dizziness, and says he had a blood pressure of 230s / 120s. He was told he had an "attack" (maybe transient ischemic attack?), because an MRI of the head showed no abnormalities. Since then he has had persistent throbbing that he can hear equally in both of his ears. This always seems to match his heart beat, increasing in frequency when he is exerting himself. He reports seeing an "ear doctor" about 6 years ago, and was told that all hearing tests were normal.    PAST MEDICAL & SURGICAL HISTORY:  Obstructive sleep apnea on CPAP  HLD (hyperlipidemia)  HTN (hypertension)    FAMILY HISTORY:  Hypertension in both parents    Social History:  - Drinks on average 2 alcoholic beverages at a time, 2 times per week.  - Denies tobacco or illicit drug use    Allergies: No Known Allergies    MEDICATIONS  (STANDING):  aspirin enteric coated 81 milliGRAM(s) Oral daily  diltiazem    milliGRAM(s) Oral <User Schedule>  enoxaparin Injectable 40 milliGRAM(s) SubCutaneous daily  losartan 100 milliGRAM(s) Oral daily  losartan 50 milliGRAM(s) Oral <User Schedule>  metoprolol succinate ER 25 milliGRAM(s) Oral daily  simvastatin 10 milliGRAM(s) Oral at bedtime    MEDICATIONS  (PRN):  acetaminophen   Tablet .. 650 milliGRAM(s) Oral every 6 hours PRN Temp greater or equal to 38C (100.4F), Mild Pain (1 - 3)    Review of systems:    Constitutional: No fever, weight loss or fatigue    Eyes: No eye pain or discharge  ENMT:  Pulsing in ears as in HPI, but without hearing deficit  Neck: No pain or stiffness  Respiratory: No cough, wheezing, chills or hemoptysis  Cardiovascular: Chest pain on admission, now resolved; No palpitations, shortness of breath, dyspnea on exertion  Gastrointestinal: No abdominal pain, nausea, vomiting or hematemesis; No diarrhea or constipation.   Genitourinary: No dysuria, frequency, hematuria or incontinence  Neurological: As per HPI  Skin: No rashes or lesions   Endocrine: No heat or cold intolerance; No hair loss  Musculoskeletal: No joint pain or swelling  Psychiatric: No depression, anxiety, mood swings  Heme/Lymph: No easy bruising or bleeding gums    Vital Signs Last 24 Hrs  T(C): 36.6 (30 Nov 2018 11:33), Max: 37.1 (29 Nov 2018 19:48)  T(F): 97.9 (30 Nov 2018 11:33), Max: 98.7 (29 Nov 2018 19:48)  HR: 83 (30 Nov 2018 11:33) (69 - 83)  BP: 145/81 (30 Nov 2018 11:33) (122/65 - 174/90)  BP(mean): --  RR: 18 (30 Nov 2018 11:33) (17 - 18)  SpO2: 100% (30 Nov 2018 11:33) (97% - 100%)    General:  Appearance is consistent with chronologic age.  No abnormal facies.   HEENT: NC/AT; Faint pulsation auscultated posterior to left ear, but not near right ear  Cardiovascular: RRR, No murmurs, Full b/l radial and pedal pulses  Respiratory: Mild b/l wheezing; No crackles or rhonchi    Neurologic Examination:  General: The patient is oriented to person, place, time and date.  Recent and remote memory intact.  Fund of knowledge is intact and normal.  Language with normal repetition, comprehension and naming.  Nondysarthric.    Cranial nerves: intact VA, VFF x 4.  EOMI x 2w/o nystagmus, PERRL.  No ptosis/weakness of eyelid closure.  Facial sensation is normal to light touch and pinprick with normal bite.  No facial asymmetry.  Hearing intact to finger rub b/l, with patient reporting persistent pulsation.  Palate elevates midline.  Tongue protrudes midline; no atrophy or fasciculations.  Motor examination:   Normal tone and bulk, and 5/5 strength, throughout all 4 extremities. No drift, spasticity, or rigidity in any of the 4 extremities.  Reflexes:   2+ and symmetric at b/l biceps, triceps, brachioradialis, patellae and Achilles.  Plantar response downgoing b/l.  Sensory examination:   Intact to light touch and pinprick at all 4 extremities.  Cerebellum:   No dysmetria with b/l FTN/HKS.  Gait: narrow based and normal; No difficulty with tiptoe, heel, and tandem gaits.    Labs:   CBC Full  -  ( 30 Nov 2018 06:14 )  WBC Count : 7.4 K/uL  Hemoglobin : 14.6 g/dL  Hematocrit : 45.1 %  Platelet Count - Automated : 236 K/uL  Mean Cell Volume : 92.2 fl  Mean Cell Hemoglobin : 29.9 pg  Mean Cell Hemoglobin Concentration : 32.4 gm/dL    11-30    140  |  108  |  17  ----------------------------<  118<H>  4.3   |  25  |  0.92    Ca    9.0      30 Nov 2018 06:14  Phos  4.3     11-30  Mg     2.0     11-30    TPro  8.2  /  Alb  3.4<L>  /  TBili  0.3  /  DBili  x   /  AST  23  /  ALT  32  /  AlkPhos  88  11-29    PT/INR - ( 29 Nov 2018 13:21 )   PT: 10.2 sec;   INR: 0.92 ratio    PTT - ( 29 Nov 2018 13:21 )  PTT:33.1 sec    Neuroimaging: None new          Assessment:  This is a 57 y/o right-handed Male with h/o HTN and possible TIA, presenting with chronic pulsatile tinnitus, concerning for intracranial stenosis or aneurysm.    Recommendations:    1. Order CT Angiogram Head and Neck to evaluate for intracranial or neck vessel abnormalities, which may explain the patient's pulsatile tinnitus and auscultated pulsation posterior to the left ear.    2. Continue strict control of blood pressure, aiming for less than 120/80.    3. Medication list reviewed; diltiazem is the only that may contribute to tinnitus, but benefit to cardiovascular system likely outweighs this.    4. No medication available to directly treat tinnitus; goal is to treat the underlying etiology, as recommended above.          Please contact the Neurology consult service with any questions.    Ervin Bradshaw MD  Neurology Attending  [facility]

## 2018-11-30 NOTE — PROGRESS NOTE ADULT - PROBLEM SELECTOR PLAN 2
-c/w current antihypertensive regimen  -Monitor BP - You presented with tinnitus   Neuro consulted Dr. Bradshaw

## 2018-11-30 NOTE — PROGRESS NOTE ADULT - SUBJECTIVE AND OBJECTIVE BOX
PGY1 Note discussed with Supervising Resident and Primary Attending.    Patient is a 58y old  Male who presents with a chief complaint of Chest Pressure (29 Nov 2018 19:09)      INTERVAL HPI/OVERNIGHT EVENTS :    MEDICATIONS  (STANDING):  aspirin enteric coated 81 milliGRAM(s) Oral daily  diltiazem    milliGRAM(s) Oral <User Schedule>  enoxaparin Injectable 40 milliGRAM(s) SubCutaneous daily  losartan 100 milliGRAM(s) Oral daily  losartan 50 milliGRAM(s) Oral <User Schedule>  metoprolol succinate ER 25 milliGRAM(s) Oral daily  simvastatin 10 milliGRAM(s) Oral at bedtime    MEDICATIONS  (PRN):  acetaminophen   Tablet .. 650 milliGRAM(s) Oral every 6 hours PRN Temp greater or equal to 38C (100.4F), Mild Pain (1 - 3)      Allergies    No Known Allergies    Intolerances        REVIEW OF SYSTEMS :  * CONSTITUTIONAL      : No Fever, Weight loss, or Fatigue  * EYES                             : No eye pain , Visual disturbances or Discharge  * RESPIRATORY             : No Cough, Wheezing, Chills or Hemoptysis; No shortness of breath  * CARDIOVASCULAR     : No Chest pain, Palpitations, Dizziness, or Leg swelling  * GASTROINTESTINAL  : No Abdominal or Epigastric pain. No Nausea, Vomiting or Hematemesis; No Diarrhea or Constipation. No Melena or Hematochezia.  * GENITOURINARY        : No Dysuria , Frequency , Haematuria   * NEUROLOGICAL          : No Headaches, Memory loss, Loss of trength, Numbness, or Tremors  * MUSCULOSKELETAL   : No Joint pain  * PSYCHIATRY                 : No Depression or Anxiety   * HEME/LYMPH              : No Easy Bruising or Bleeding gums  * SKIN                               : No Itching, Burning, Rashes, or Lesions     Vital Signs Last 24 Hrs  T(C): 36.5 (30 Nov 2018 05:00), Max: 37.1 (29 Nov 2018 19:48)  T(F): 97.7 (30 Nov 2018 05:00), Max: 98.7 (29 Nov 2018 19:48)  HR: 69 (30 Nov 2018 05:00) (69 - 107)  BP: 128/81 (30 Nov 2018 05:00) (122/65 - 192/92)  BP(mean): --  RR: 18 (30 Nov 2018 05:00) (16 - 18)  SpO2: 97% (30 Nov 2018 05:00) (97% - 100%)    PHYSICAL EXAM :  * GENERAL                 : NAD, Well-groomed, Well-developed  * HEAD                       :  Atraumatic, Normocephalic  * EYES                         : EOMI, PERRLA, Conjunctiva and Sclera clear  * ENT                           : Moist Mucous Membranes  * NECK                         : Supple, No JVD, Normal Thyroid  * CHEST/LUNG           : Clear to Auscultation bilaterally; No Rales, Rhonchi, Wheezing or Rubs  * HEART                       : Regular Rate and Rhythm; No murmurs, Rubs or gallops  * ABDOMEN                : Soft, Non-tender, Non-distended; Bowel Sounds present  * NERVOUS SYSTEM  :  Alert & Oriented X3, Good Concentration; Motor Strength 5/5 B/L UL LL ; DTRs 2+ Intact and Symmetric  * EXTREMITIES            :  2+ Peripheral Pulses, No clubbing, cyanosis, or edema  * SKIN                           : No Rashes or Lesions    LABS:                          14.6   7.4   )-----------( 236      ( 30 Nov 2018 06:14 )             45.1     11-30    140  |  108  |  17  ----------------------------<  118<H>  4.3   |  25  |  x     Ca    9.0      30 Nov 2018 06:14  Mg     2.0     11-30    TPro  8.2  /  Alb  3.4<L>  /  TBili  0.3  /  DBili  x   /  AST  23  /  ALT  32  /  AlkPhos  88  11-29    PT/INR - ( 29 Nov 2018 13:21 )   PT: 10.2 sec;   INR: 0.92 ratio         PTT - ( 29 Nov 2018 13:21 )  PTT:33.1 sec    CAPILLARY BLOOD GLUCOSE          RADIOLOGY & ADDITIONAL TESTS:   No radiological imaging was required    Imaging Personally Reviewed   :  [ ] YES  [ ] NO    Consultant(s) Notes Reviewed :  [ ] YES  [ ] NO PGY1 Note discussed with Supervising Resident and Primary Attending.    Patient is a 58y old  Male who presents with a chief complaint of Chest Pressure (29 Nov 2018 19:09)      INTERVAL HPI/OVERNIGHT EVENTS : No acute events reported overnight.    Pt is seen at the bedside. Pt is found resting comfortably in bed in no acute distress, reports feeling well and denies any new complaints today. Patient denies nausea, vomiting, diarrhea, chest pain, SOB, headache, dizziness.     MEDICATIONS  (STANDING):  aspirin enteric coated 81 milliGRAM(s) Oral daily  diltiazem    milliGRAM(s) Oral <User Schedule>  enoxaparin Injectable 40 milliGRAM(s) SubCutaneous daily  losartan 100 milliGRAM(s) Oral daily  losartan 50 milliGRAM(s) Oral <User Schedule>  metoprolol succinate ER 25 milliGRAM(s) Oral daily  simvastatin 10 milliGRAM(s) Oral at bedtime    MEDICATIONS  (PRN):  acetaminophen   Tablet .. 650 milliGRAM(s) Oral every 6 hours PRN Temp greater or equal to 38C (100.4F), Mild Pain (1 - 3)      Allergies    No Known Allergies    Intolerances        REVIEW OF SYSTEMS :  * CONSTITUTIONAL      : No Fever, Weight loss, or Fatigue  * EYES                             : No eye pain , Visual disturbances or Discharge  * RESPIRATORY             : No Cough, Wheezing, Chills or Hemoptysis; No shortness of breath  * CARDIOVASCULAR     : No Chest pain, Palpitations, Dizziness, or Leg swelling  * GASTROINTESTINAL  : No Abdominal or Epigastric pain. No Nausea, Vomiting or Hematemesis; No Diarrhea or Constipation. No Melena or Hematochezia.  * GENITOURINARY        : No Dysuria , Frequency , Haematuria   * NEUROLOGICAL          : No Headaches, Memory loss, Loss of trength, Numbness, or Tremors  * MUSCULOSKELETAL   : No Joint pain  * PSYCHIATRY                 : No Depression or Anxiety   * HEME/LYMPH              : No Easy Bruising or Bleeding gums  * SKIN                               : No Itching, Burning, Rashes, or Lesions     Vital Signs Last 24 Hrs  T(C): 36.5 (30 Nov 2018 05:00), Max: 37.1 (29 Nov 2018 19:48)  T(F): 97.7 (30 Nov 2018 05:00), Max: 98.7 (29 Nov 2018 19:48)  HR: 69 (30 Nov 2018 05:00) (69 - 107)  BP: 128/81 (30 Nov 2018 05:00) (122/65 - 192/92)  BP(mean): --  RR: 18 (30 Nov 2018 05:00) (16 - 18)  SpO2: 97% (30 Nov 2018 05:00) (97% - 100%)    PHYSICAL EXAM :  * GENERAL                 : NAD, Well-groomed, Well-developed  * HEAD                       :  Atraumatic, Normocephalic  * EYES                         : EOMI, PERRLA, Conjunctiva and Sclera clear  * ENT                           : Moist Mucous Membranes  * NECK                         : Supple, No JVD, Normal Thyroid  * CHEST/LUNG           : Clear to Auscultation bilaterally; No Rales, Rhonchi, Wheezing or Rubs  * HEART                       : Regular Rate and Rhythm; No murmurs, Rubs or gallops  * ABDOMEN                : Soft, Non-tender, Non-distended; Bowel Sounds present  * NERVOUS SYSTEM  :  Alert & Oriented X3, Good Concentration; Motor Strength 5/5 B/L UL LL ; DTRs 2+ Intact and Symmetric  * EXTREMITIES            :  2+ Peripheral Pulses, No clubbing, cyanosis, or edema  * SKIN                           : No Rashes or Lesions    LABS:                          14.6   7.4   )-----------( 236      ( 30 Nov 2018 06:14 )             45.1     11-30    140  |  108  |  17  ----------------------------<  118<H>  4.3   |  25  |  x     Ca    9.0      30 Nov 2018 06:14  Mg     2.0     11-30    TPro  8.2  /  Alb  3.4<L>  /  TBili  0.3  /  DBili  x   /  AST  23  /  ALT  32  /  AlkPhos  88  11-29    PT/INR - ( 29 Nov 2018 13:21 )   PT: 10.2 sec;   INR: 0.92 ratio         PTT - ( 29 Nov 2018 13:21 )  PTT:33.1 sec    CAPILLARY BLOOD GLUCOSE          RADIOLOGY & ADDITIONAL TESTS:   No radiological imaging was required    Imaging Personally Reviewed   :  [ ] YES  [ ] NO    Consultant(s) Notes Reviewed :  [ ] YES  [ ] NO

## 2018-11-30 NOTE — CONSULT NOTE ADULT - PROBLEM SELECTOR RECOMMENDATION 9
Patient presented with chest pain and palpitations  EKG NSR without ischemia  Continue aspirin, Toprol and statin  Follow ECHO. Patient schedule for NST today Patient presented with chest pain and palpitations  EKG NSR without ischemia  Continue aspirin, Toprol and statin. Continue Cardizem and losartan (pre admission medication prescribed by PCP.)  Follow ECHO. Patient schedule for NST today

## 2018-11-30 NOTE — DISCHARGE NOTE ADULT - CARE PROVIDER_API CALL
Rachid Garcia), Medicine  Dept Director  [address] [place], [place] [address]  Phone: [phone]  Fax: [phone]

## 2018-11-30 NOTE — DISCHARGE NOTE ADULT - HOSPITAL COURSE
58M, from home, w/ PMHx of HTN, HLD, Sleep Apnea (On CPAP) who presents with chest pressure starting today.  In the ED, pt presents in no acute distress, vitals WNL, and EKG NSR.  Pt remains afebrile w/o leukocytosis.  Remaining labs WNL.  Trop 1 neg.  CXR clear.    Pt will be admitted to tele for ACS to r/o NSTEMI    For  ACS (acute coronary syndrome. Pt reports Chest pressure today lasting 1 hr, EKG NSR, Trop x 3 negative, c/w asa, statin, b-blocker   Tele monitoring done.  ECHO showed Ejection Fraction 60 % , Grade 1 Diastolic Dysfunction, Stress test -ve, CTA chest to rule ou PE as pt p/w palpitations.     For  Tinnitus,  You presented with tinnitus, CTA head and neck was negative. For  HTN (hypertension),  -c/w current antihypertensive regimen  -Monitor BP.    For  HLD (hyperlipidemia). Plan: -c/w statin therapy.    For  Sleep apnea. Plan: -c/w CPAP at night.    Patient is medically stable to be discharged. Discharged prepared by intern on call. Case discussed with the attending doctor

## 2018-11-30 NOTE — PROGRESS NOTE ADULT - PROBLEM SELECTOR PLAN 3
-c/w statin therapy  -f/u lipid panel -c/w statin therapy -c/w current antihypertensive regimen  -Monitor BP

## 2018-11-30 NOTE — PROGRESS NOTE ADULT - SUBJECTIVE AND OBJECTIVE BOX
HPI:  Pt is a 58M, from home, w/ PMHx of HTN, HLD, Sleep Apnea (On CPAP) who presents with chest pressure starting today.  Pt states he was at rest when he began experiencing constant chest pressure today, 5/10 intensity, non radiating, not relieved with asa.  Pt denies CP, palpitations, HA, dizziness, blurred vision, diaphoresis, or syncope.  Pt states he took his BP and systolic was in the 180's.  Pt reports prior cardiac workup 4 years prior which was "normal."  Pt saw PCP, Dr. Armand Bradshaw, 3 weeks prior with no new events.  Pt denies fever, abd pain, cough, N/V/D, dizziness, HA, or weakness.       In the ED, pt presents in no acute distress, vitals WNL, and EKG NSR (29 Nov 2018 19:09)      Patient is a 58y old  Male who presents with a chief complaint of Chest Pressure (30 Nov 2018 14:00)      INTERVAL HPI/OVERNIGHT EVENTS:  T(C): 36.6 (11-30-18 @ 15:51), Max: 37.1 (11-29-18 @ 19:48)  HR: 87 (11-30-18 @ 15:51) (69 - 87)  BP: 152/91 (11-30-18 @ 15:51) (122/65 - 174/90)  RR: 18 (11-30-18 @ 15:51) (17 - 18)  SpO2: 98% (11-30-18 @ 15:51) (97% - 100%)  Wt(kg): --  I&O's Summary      REVIEW OF SYSTEMS: denies fever, chills, SOB, palpitations, chest pain, abdominal pain, nausea, vomitting, diarrhea, constipation, dizziness    MEDICATIONS  (STANDING):  aspirin enteric coated 81 milliGRAM(s) Oral daily  diltiazem    milliGRAM(s) Oral <User Schedule>  enoxaparin Injectable 40 milliGRAM(s) SubCutaneous daily  losartan 100 milliGRAM(s) Oral daily  losartan 50 milliGRAM(s) Oral <User Schedule>  metoprolol succinate ER 25 milliGRAM(s) Oral daily  simvastatin 10 milliGRAM(s) Oral at bedtime    MEDICATIONS  (PRN):  acetaminophen   Tablet .. 650 milliGRAM(s) Oral every 6 hours PRN Temp greater or equal to 38C (100.4F), Mild Pain (1 - 3)      PHYSICAL EXAM:  GENERAL: NAD, well-groomed, well-developed  HEAD:  Atraumatic, Normocephalic  EYES: EOMI, PERRLA, conjunctiva and sclera clear  ENMT: No tonsillar erythema, exudates, or enlargement; Moist mucous membranes, Good dentition, No lesions  NECK: Supple, No JVD, Normal thyroid  NERVOUS SYSTEM:  Alert & Oriented X3, Good concentration; Motor Strength 5/5 B/L upper and lower extremities; DTRs 2+ intact and symmetric  CHEST/LUNG: Clear to percussion bilaterally; No rales, rhonchi, wheezing, or rubs  HEART: Regular rate and rhythm; No murmurs, rubs, or gallops  ABDOMEN: Soft, Nontender, Nondistended; Bowel sounds present  EXTREMITIES:  2+ Peripheral Pulses, No clubbing, cyanosis, or edema  LYMPH: No lymphadenopathy noted  SKIN: No rashes or lesions  LABS:                        14.6   7.4   )-----------( 236      ( 30 Nov 2018 06:14 )             45.1     11-30    140  |  108  |  17  ----------------------------<  118<H>  4.3   |  25  |  0.92    Ca    9.0      30 Nov 2018 06:14  Phos  4.3     11-30  Mg     2.0     11-30    TPro  8.2  /  Alb  3.4<L>  /  TBili  0.3  /  DBili  x   /  AST  23  /  ALT  32  /  AlkPhos  88  11-29    PT/INR - ( 29 Nov 2018 13:21 )   PT: 10.2 sec;   INR: 0.92 ratio         PTT - ( 29 Nov 2018 13:21 )  PTT:33.1 sec    CAPILLARY BLOOD GLUCOSE

## 2018-11-30 NOTE — DISCHARGE NOTE ADULT - CARE PLAN
Principal Discharge DX:	Chest pain  Goal:	no chest pain, Cardiology follow up for Event recorder  Assessment and plan of treatment:	You presented with chest pain. You were admitted to rule out ACS. EKG showed NSR, Trop x 3 negative.  ECHO showed Ejection Fraction 60 % , Grade 1 Diastolic Dysfunction, Stress test was normal.  CTA chest was done and it was negative for PE. You are recommended to follow up up with Dr Garcia in outpatient setting for Event recorder placement. You are also recommended to follow up with PCP in 3 days to get BMP tested to check for your Kidney function as you received Contrast  for imaging. You are also recommended to continue with your home regimen of medications  Secondary Diagnosis:	HLD (hyperlipidemia)  Assessment and plan of treatment:	You are recommended to take medications as advised and follow up with PCP  Secondary Diagnosis:	HTN (hypertension)  Assessment and plan of treatment:	You are recommended to continue with home regimen of HTN medication, exercise, take DASH diet  and follow up with PCP  Secondary Diagnosis:	Tinnitus  Assessment and plan of treatment:	You CTA head and neck was normal. You are recommended to follow up with PCP. Principal Discharge DX:	Chest pain  Goal:	no chest pain, Cardiology follow up for Event recorder  Assessment and plan of treatment:	You presented with chest pain. You were admitted to rule out ACS. EKG showed NSR, Trop x 3 negative.  ECHO showed Ejection Fraction 60 % , Grade 1 Diastolic Dysfunction, Stress test was normal.  CTA chest was done and it was negative for PE. You are recommended to follow up up with Dr Garcia in outpatient setting for Event recorder placement. You are also recommended to follow up with PCP in 3 days to get BMP tested to check for your Kidney function as you received Contrast  for imaging. You are also recommended to continue with your home regimen of medications  Secondary Diagnosis:	HLD (hyperlipidemia)  Assessment and plan of treatment:	maintain normal lipid level. (LDL < 100)    Please take your medication Lipitor 40mg regularly   Please maintain healthy lifestyle by eating healthy as mentioned above, exercise regularly and maintain weight.   Please Follow up with your doctor in 1 week.  Secondary Diagnosis:	HTN (hypertension)  Assessment and plan of treatment:	.discharge  Secondary Diagnosis:	Tinnitus  Assessment and plan of treatment:	You CTA head and neck was normal. You are recommended to follow up with PCP.

## 2018-11-30 NOTE — CONSULT NOTE ADULT - SUBJECTIVE AND OBJECTIVE BOX
58M, from home, w/ PMHx of HTN and Hld presented with chest pressure since 1 day. Patient had associated palpitations. Patient denies any cardiac surgeries. Patient is on Losartan, Diltiazem and Toprol. Patient had normal NST and Echo 4 years ago and that was normal. Patient complaints of tinnitus without hearing loss from x 10 years. Already saw ENT with no cause found     INTERVAL HPI/OVERNIGHT EVENTS:  T(C): 36.5 (11-30-18 @ 07:57), Max: 37.1 (11-29-18 @ 19:48)  HR: 71 (11-30-18 @ 07:57) (69 - 107)  BP: 159/88 (11-30-18 @ 07:57) (122/65 - 192/92)  RR: 18 (11-30-18 @ 07:57) (16 - 18)  SpO2: 100% (11-30-18 @ 07:57) (97% - 100%)  Wt(kg): --  I&O's Summary      Nevaeh Forrest; Vinicius Han; Vinicius Han; Vinicius Han; Kristal Chan; Usman Parsons; Richi Segundo; Yvrose Giron; Rachid Garcia; Richi Segundo; Richi Seugndo; Aracely De Guzman    LABS:                        14.6   7.4   )-----------( 236      ( 30 Nov 2018 06:14 )             45.1     11-30    140  |  108  |  17  ----------------------------<  118<H>  4.3   |  25  |  0.92    Ca    9.0      30 Nov 2018 06:14  Phos  4.3     11-30  Mg     2.0     11-30    TPro  8.2  /  Alb  3.4<L>  /  TBili  0.3  /  DBili  x   /  AST  23  /  ALT  32  /  AlkPhos  88  11-29    PT/INR - ( 29 Nov 2018 13:21 )   PT: 10.2 sec;   INR: 0.92 ratio         PTT - ( 29 Nov 2018 13:21 )  PTT:33.1 sec    CAPILLARY BLOOD GLUCOSE                  REVIEW OF SYSTEMS:    CONSTITUTIONAL: No weakness, fevers or chills  NECK: No pain or stiffness  RESPIRATORY: No cough, wheezing, hemoptysis; No shortness of breath  CARDIOVASCULAR: +chest pain or palpitations  GASTROINTESTINAL: No abdominal or epigastric pain. No nausea, vomiting, No diarrhea or constipation. No melena or hematochezia.  GENITOURINARY: No dysuria, frequency or hematuria  NEUROLOGICAL: No numbness or weakness  All other review of systems is negative unless indicated above      PHYSICAL EXAM:  GENERAL: NAD, well-groomed, well-developed  EYES: EOMI, PERRLA,   ENMT: No tonsillar erythema, exudates, or enlargement;   NECK: Supple, No JVD, Normal thyroid  NERVOUS SYSTEM:  Alert & Oriented X3, Good concentration; Motor Strength 5/5 B/L upper and lower extremities; DTRs 2+ intact and symmetric  CHEST/LUNG: Clear to percussion bilaterally; No rales, rhonchi, wheezing, or rubs  HEART: Regular rate and rhythm; No murmurs, rubs, or gallops  ABDOMEN: Soft, Nontender, Nondistended; Bowel sounds present  EXTREMITIES:  2+ Peripheral Pulses, No clubbing, cyanosis, or edema      Care Discussed with primary team

## 2018-11-30 NOTE — PROGRESS NOTE ADULT - PROBLEM SELECTOR PLAN 5
[] Previous VTE                                                3  [] Thrombophilia                                             2  [] Lower limb paralysis                                   2    [] Current Cancer                                             2   [x] Immobilization > 24 hrs                              1  [] ICU/CCU stay > 24 hours                             1  [] Age > 60                                                         1    IMPROVE VTE Score: Lovenox sub q for DVT prophy -c/w CPAP at night

## 2018-12-01 VITALS — HEART RATE: 69 BPM | DIASTOLIC BLOOD PRESSURE: 83 MMHG | SYSTOLIC BLOOD PRESSURE: 158 MMHG

## 2018-12-01 LAB
ANION GAP SERPL CALC-SCNC: 7 MMOL/L — SIGNIFICANT CHANGE UP (ref 5–17)
BUN SERPL-MCNC: 23 MG/DL — HIGH (ref 7–18)
CALCIUM SERPL-MCNC: 8.9 MG/DL — SIGNIFICANT CHANGE UP (ref 8.4–10.5)
CHLORIDE SERPL-SCNC: 107 MMOL/L — SIGNIFICANT CHANGE UP (ref 96–108)
CO2 SERPL-SCNC: 28 MMOL/L — SIGNIFICANT CHANGE UP (ref 22–31)
CREAT SERPL-MCNC: 1.19 MG/DL — SIGNIFICANT CHANGE UP (ref 0.5–1.3)
GLUCOSE SERPL-MCNC: 102 MG/DL — HIGH (ref 70–99)
HCT VFR BLD CALC: 44.3 % — SIGNIFICANT CHANGE UP (ref 39–50)
HGB BLD-MCNC: 14.4 G/DL — SIGNIFICANT CHANGE UP (ref 13–17)
MAGNESIUM SERPL-MCNC: 2.1 MG/DL — SIGNIFICANT CHANGE UP (ref 1.6–2.6)
MCHC RBC-ENTMCNC: 30.3 PG — SIGNIFICANT CHANGE UP (ref 27–34)
MCHC RBC-ENTMCNC: 32.6 GM/DL — SIGNIFICANT CHANGE UP (ref 32–36)
MCV RBC AUTO: 93.1 FL — SIGNIFICANT CHANGE UP (ref 80–100)
PHOSPHATE SERPL-MCNC: 3.5 MG/DL — SIGNIFICANT CHANGE UP (ref 2.5–4.5)
PLATELET # BLD AUTO: 241 K/UL — SIGNIFICANT CHANGE UP (ref 150–400)
POTASSIUM SERPL-MCNC: 4.6 MMOL/L — SIGNIFICANT CHANGE UP (ref 3.5–5.3)
POTASSIUM SERPL-SCNC: 4.6 MMOL/L — SIGNIFICANT CHANGE UP (ref 3.5–5.3)
RBC # BLD: 4.76 M/UL — SIGNIFICANT CHANGE UP (ref 4.2–5.8)
RBC # FLD: 11.9 % — SIGNIFICANT CHANGE UP (ref 10.3–14.5)
SODIUM SERPL-SCNC: 142 MMOL/L — SIGNIFICANT CHANGE UP (ref 135–145)
WBC # BLD: 8.2 K/UL — SIGNIFICANT CHANGE UP (ref 3.8–10.5)
WBC # FLD AUTO: 8.2 K/UL — SIGNIFICANT CHANGE UP (ref 3.8–10.5)

## 2018-12-01 PROCEDURE — 70498 CT ANGIOGRAPHY NECK: CPT | Mod: 26

## 2018-12-01 PROCEDURE — 71275 CT ANGIOGRAPHY CHEST: CPT | Mod: 26

## 2018-12-01 PROCEDURE — 70496 CT ANGIOGRAPHY HEAD: CPT | Mod: 26

## 2018-12-01 RX ADMIN — Medication 360 MILLIGRAM(S): at 12:38

## 2018-12-01 RX ADMIN — Medication 81 MILLIGRAM(S): at 11:23

## 2018-12-01 RX ADMIN — Medication 650 MILLIGRAM(S): at 02:37

## 2018-12-01 RX ADMIN — Medication 650 MILLIGRAM(S): at 03:15

## 2018-12-01 NOTE — PROGRESS NOTE ADULT - ASSESSMENT
58 yr old m h/o htn . hld , sleep apnea syndrome  brought in sec to Palpitation yesterday started on rest  no dizzyness, lasted a few mnts,  no dizzyness, no cp .  pt has 2 more episodes in past  same way.  pt is obese can walk a male  afew days a aweek without any cp( he declined to me)or sob or palp  pts younger brother had stents recently.  non smoker , occasional etoh, only 2 cups of tea /day  vs stable afebrile  lungs heart abd , cns unremarkable  no calf tenderness  labs noted  normal tsh  medex noted   echo mild diast dysfunction  stress test fixed defect  a/p pt needs cardiology to f/p  with holter monitor,  f/h of mi/stents  may need angiogram,   htn, hld and wt to be under control  diet expalined in details
seen and examined vs stable  afebrile  no cp or sob .  no palpitation since adm  labs noted   ct scan of neck, chest , head noted  no PE, small lung nodules in lung 3 mm, pt is non smoker  ct chest in 6 to 12months  d/w pt to f/u with card to r/o afib,     d/w cardiologist also    pcp labs outpt.  diet exercise wt reduction.  pt has h/o htn, hld .htn overwt and strong f/h of cad  , i d/w card may vladimir angiogram in future  although stress negative  , he will decide
Pt is a 58M, from home, w/ PMHx of HTN, HLD, Sleep Apnea (On CPAP) who presents with chest pressure starting today.  In the ED, pt presents in no acute distress, vitals WNL, and EKG NSR.  Pt remains afebrile w/o leukocytosis.  Remaining labs WNL.  Trop 1 neg.  CXR clear.    Pt will be admitted to [facility] for ACS to r/o NSTEMI

## 2018-12-01 NOTE — PROGRESS NOTE ADULT - SUBJECTIVE AND OBJECTIVE BOX
HPI:  Pt is a 58M, from home, w/ PMHx of HTN, HLD, Sleep Apnea (On CPAP) who presents with chest pressure starting today.  Pt states he was at rest when he began experiencing constant chest pressure today, 5/10 intensity, non radiating, not relieved with asa.  Pt denies CP, palpitations, HA, dizziness, blurred vision, diaphoresis, or syncope.  Pt states he took his BP and systolic was in the 180's.  Pt reports prior cardiac workup 4 years prior which was "normal."  Pt saw PCP, Dr. Armand Bradshaw, 3 weeks prior with no new events.  Pt denies fever, abd pain, cough, N/V/D, dizziness, HA, or weakness.       In the ED, pt presents in no acute distress, vitals WNL, and EKG NSR (29 Nov 2018 19:09)      Patient is a 58y old  Male who presents with a chief complaint of Chest Pressure (30 Nov 2018 16:19)      INTERVAL HPI/OVERNIGHT EVENTS:  T(C): 36.8 (12-01-18 @ 11:43), Max: 37.1 (12-01-18 @ 06:08)  HR: 69 (12-01-18 @ 12:41) (58 - 86)  BP: 158/83 (12-01-18 @ 12:41) (111/52 - 158/83)  RR: 18 (12-01-18 @ 11:43) (17 - 18)  SpO2: 98% (12-01-18 @ 11:43) (97% - 99%)  Wt(kg): --  I&O's Summary      REVIEW OF SYSTEMS: denies fever, chills, SOB, palpitations, chest pain, abdominal pain, nausea, vomitting, diarrhea, constipation, dizziness    MEDICATIONS  (STANDING):  aspirin enteric coated 81 milliGRAM(s) Oral daily  diltiazem    milliGRAM(s) Oral <User Schedule>  enoxaparin Injectable 40 milliGRAM(s) SubCutaneous daily  losartan 100 milliGRAM(s) Oral daily  losartan 50 milliGRAM(s) Oral <User Schedule>  metoprolol succinate ER 25 milliGRAM(s) Oral daily  simvastatin 10 milliGRAM(s) Oral at bedtime    MEDICATIONS  (PRN):  acetaminophen   Tablet .. 650 milliGRAM(s) Oral every 6 hours PRN Temp greater or equal to 38C (100.4F), Mild Pain (1 - 3)      PHYSICAL EXAM:  GENERAL: NAD, well-groomed, well-developed  HEAD:  Atraumatic, Normocephalic  EYES: EOMI, PERRLA, conjunctiva and sclera clear  ENMT: No tonsillar erythema, exudates, or enlargement; Moist mucous membranes, Good dentition, No lesions  NECK: Supple, No JVD, Normal thyroid  NERVOUS SYSTEM:  Alert & Oriented X3, Good concentration; Motor Strength 5/5 B/L upper and lower extremities; DTRs 2+ intact and symmetric  CHEST/LUNG: Clear to percussion bilaterally; No rales, rhonchi, wheezing, or rubs  HEART: Regular rate and rhythm; No murmurs, rubs, or gallops  ABDOMEN: Soft, Nontender, Nondistended; Bowel sounds present  EXTREMITIES:  2+ Peripheral Pulses, No clubbing, cyanosis, or edema  LYMPH: No lymphadenopathy noted  SKIN: No rashes or lesions  LABS:                        14.4   8.2   )-----------( 241      ( 01 Dec 2018 06:23 )             44.3     12-01    142  |  107  |  23<H>  ----------------------------<  102<H>  4.6   |  28  |  1.19    Ca    8.9      01 Dec 2018 06:23  Phos  3.5     12-01  Mg     2.1     12-01          CAPILLARY BLOOD GLUCOSE

## 2018-12-07 ENCOUNTER — INPATIENT (INPATIENT)
Facility: HOSPITAL | Age: 58
LOS: 1 days | Discharge: HOME | End: 2018-12-09
Attending: INTERNAL MEDICINE | Admitting: INTERNAL MEDICINE
Payer: COMMERCIAL

## 2018-12-07 VITALS
OXYGEN SATURATION: 99 % | TEMPERATURE: 99 F | RESPIRATION RATE: 15 BRPM | DIASTOLIC BLOOD PRESSURE: 70 MMHG | SYSTOLIC BLOOD PRESSURE: 125 MMHG | HEART RATE: 101 BPM

## 2018-12-07 DIAGNOSIS — I20.0 UNSTABLE ANGINA: ICD-10-CM

## 2018-12-07 PROBLEM — E78.5 HYPERLIPIDEMIA, UNSPECIFIED: Chronic | Status: ACTIVE | Noted: 2018-11-29

## 2018-12-07 PROBLEM — G47.30 SLEEP APNEA, UNSPECIFIED: Chronic | Status: ACTIVE | Noted: 2018-11-29

## 2018-12-07 LAB
B PERT DNA SPEC QL NAA+PROBE: NOT DETECTED — SIGNIFICANT CHANGE UP
BUN SERPL-MCNC: 33 MG/DL — HIGH (ref 7–23)
C PNEUM DNA SPEC QL NAA+PROBE: NOT DETECTED — SIGNIFICANT CHANGE UP
CALCIUM SERPL-MCNC: 10 MG/DL — SIGNIFICANT CHANGE UP (ref 8.4–10.5)
CHLORIDE SERPL-SCNC: 102 MMOL/L — SIGNIFICANT CHANGE UP (ref 98–107)
CK MB BLD-MCNC: 5.83 NG/ML — SIGNIFICANT CHANGE UP (ref 1–6.6)
CK SERPL-CCNC: 88 U/L — SIGNIFICANT CHANGE UP (ref 30–200)
CO2 SERPL-SCNC: 21 MMOL/L — LOW (ref 22–31)
CREAT SERPL-MCNC: 1.55 MG/DL — HIGH (ref 0.5–1.3)
FLUAV H1 2009 PAND RNA SPEC QL NAA+PROBE: NOT DETECTED — SIGNIFICANT CHANGE UP
FLUAV H1 RNA SPEC QL NAA+PROBE: NOT DETECTED — SIGNIFICANT CHANGE UP
FLUAV H3 RNA SPEC QL NAA+PROBE: NOT DETECTED — SIGNIFICANT CHANGE UP
FLUAV SUBTYP SPEC NAA+PROBE: SIGNIFICANT CHANGE UP
FLUBV RNA SPEC QL NAA+PROBE: NOT DETECTED — SIGNIFICANT CHANGE UP
GLUCOSE SERPL-MCNC: 114 MG/DL — HIGH (ref 70–99)
HADV DNA SPEC QL NAA+PROBE: NOT DETECTED — SIGNIFICANT CHANGE UP
HCOV PNL SPEC NAA+PROBE: SIGNIFICANT CHANGE UP
HCT VFR BLD CALC: 48.3 % — SIGNIFICANT CHANGE UP (ref 39–50)
HGB BLD-MCNC: 16.2 G/DL — SIGNIFICANT CHANGE UP (ref 13–17)
HMPV RNA SPEC QL NAA+PROBE: NOT DETECTED — SIGNIFICANT CHANGE UP
HPIV1 RNA SPEC QL NAA+PROBE: NOT DETECTED — SIGNIFICANT CHANGE UP
HPIV2 RNA SPEC QL NAA+PROBE: NOT DETECTED — SIGNIFICANT CHANGE UP
HPIV3 RNA SPEC QL NAA+PROBE: NOT DETECTED — SIGNIFICANT CHANGE UP
HPIV4 RNA SPEC QL NAA+PROBE: NOT DETECTED — SIGNIFICANT CHANGE UP
MCHC RBC-ENTMCNC: 29.8 PG — SIGNIFICANT CHANGE UP (ref 27–34)
MCHC RBC-ENTMCNC: 33.5 % — SIGNIFICANT CHANGE UP (ref 32–36)
MCV RBC AUTO: 89 FL — SIGNIFICANT CHANGE UP (ref 80–100)
NRBC # FLD: 0 — SIGNIFICANT CHANGE UP
PLATELET # BLD AUTO: 287 K/UL — SIGNIFICANT CHANGE UP (ref 150–400)
PMV BLD: 10.2 FL — SIGNIFICANT CHANGE UP (ref 7–13)
POTASSIUM SERPL-MCNC: 4.3 MMOL/L — SIGNIFICANT CHANGE UP (ref 3.5–5.3)
POTASSIUM SERPL-MCNC: SIGNIFICANT CHANGE UP MMOL/L (ref 3.5–5.3)
POTASSIUM SERPL-SCNC: 4.3 MMOL/L — SIGNIFICANT CHANGE UP (ref 3.5–5.3)
POTASSIUM SERPL-SCNC: SIGNIFICANT CHANGE UP MMOL/L (ref 3.5–5.3)
RBC # BLD: 5.43 M/UL — SIGNIFICANT CHANGE UP (ref 4.2–5.8)
RBC # FLD: 12.1 % — SIGNIFICANT CHANGE UP (ref 10.3–14.5)
RSV RNA SPEC QL NAA+PROBE: NOT DETECTED — SIGNIFICANT CHANGE UP
RV+EV RNA SPEC QL NAA+PROBE: NOT DETECTED — SIGNIFICANT CHANGE UP
SODIUM SERPL-SCNC: 138 MMOL/L — SIGNIFICANT CHANGE UP (ref 135–145)
TROPONIN T, HIGH SENSITIVITY: 49 NG/L — SIGNIFICANT CHANGE UP (ref ?–14)
WBC # BLD: 10.3 K/UL — SIGNIFICANT CHANGE UP (ref 3.8–10.5)
WBC # FLD AUTO: 10.3 K/UL — SIGNIFICANT CHANGE UP (ref 3.8–10.5)

## 2018-12-07 PROCEDURE — 93010 ELECTROCARDIOGRAM REPORT: CPT | Mod: 77

## 2018-12-07 RX ORDER — METOPROLOL TARTRATE 50 MG
25 TABLET ORAL DAILY
Qty: 0 | Refills: 0 | Status: DISCONTINUED | OUTPATIENT
Start: 2018-12-07 | End: 2018-12-08

## 2018-12-07 RX ORDER — TICAGRELOR 90 MG/1
90 TABLET ORAL
Qty: 0 | Refills: 0 | Status: DISCONTINUED | OUTPATIENT
Start: 2018-12-08 | End: 2018-12-09

## 2018-12-07 RX ORDER — LOSARTAN POTASSIUM 100 MG/1
1 TABLET, FILM COATED ORAL
Qty: 0 | Refills: 0 | COMMUNITY

## 2018-12-07 RX ORDER — HYDRALAZINE HCL 50 MG
100 TABLET ORAL EVERY 12 HOURS
Qty: 0 | Refills: 0 | Status: DISCONTINUED | OUTPATIENT
Start: 2018-12-07 | End: 2018-12-09

## 2018-12-07 RX ORDER — ASPIRIN/CALCIUM CARB/MAGNESIUM 324 MG
1 TABLET ORAL
Qty: 0 | Refills: 0 | COMMUNITY

## 2018-12-07 RX ORDER — ATORVASTATIN CALCIUM 80 MG/1
40 TABLET, FILM COATED ORAL AT BEDTIME
Qty: 0 | Refills: 0 | Status: DISCONTINUED | OUTPATIENT
Start: 2018-12-07 | End: 2018-12-09

## 2018-12-07 RX ORDER — DILTIAZEM HCL 120 MG
1 CAPSULE, EXT RELEASE 24 HR ORAL
Qty: 0 | Refills: 0 | COMMUNITY

## 2018-12-07 RX ORDER — SODIUM CHLORIDE 9 MG/ML
1000 INJECTION INTRAMUSCULAR; INTRAVENOUS; SUBCUTANEOUS
Qty: 0 | Refills: 0 | Status: DISCONTINUED | OUTPATIENT
Start: 2018-12-07 | End: 2018-12-09

## 2018-12-07 RX ORDER — ACETAMINOPHEN 500 MG
650 TABLET ORAL EVERY 6 HOURS
Qty: 0 | Refills: 0 | Status: DISCONTINUED | OUTPATIENT
Start: 2018-12-07 | End: 2018-12-09

## 2018-12-07 RX ORDER — HEPARIN SODIUM 5000 [USP'U]/ML
5000 INJECTION INTRAVENOUS; SUBCUTANEOUS EVERY 12 HOURS
Qty: 0 | Refills: 0 | Status: DISCONTINUED | OUTPATIENT
Start: 2018-12-08 | End: 2018-12-09

## 2018-12-07 RX ORDER — SODIUM CHLORIDE 9 MG/ML
3 INJECTION INTRAMUSCULAR; INTRAVENOUS; SUBCUTANEOUS EVERY 8 HOURS
Qty: 0 | Refills: 0 | Status: DISCONTINUED | OUTPATIENT
Start: 2018-12-07 | End: 2018-12-09

## 2018-12-07 RX ORDER — ASPIRIN/CALCIUM CARB/MAGNESIUM 324 MG
81 TABLET ORAL DAILY
Qty: 0 | Refills: 0 | Status: DISCONTINUED | OUTPATIENT
Start: 2018-12-08 | End: 2018-12-09

## 2018-12-07 RX ADMIN — Medication 650 MILLIGRAM(S): at 17:21

## 2018-12-07 RX ADMIN — Medication 650 MILLIGRAM(S): at 17:48

## 2018-12-07 RX ADMIN — Medication 100 MILLIGRAM(S): at 22:14

## 2018-12-07 RX ADMIN — Medication 650 MILLIGRAM(S): at 23:00

## 2018-12-07 RX ADMIN — SODIUM CHLORIDE 75 MILLILITER(S): 9 INJECTION INTRAMUSCULAR; INTRAVENOUS; SUBCUTANEOUS at 17:21

## 2018-12-07 RX ADMIN — ATORVASTATIN CALCIUM 40 MILLIGRAM(S): 80 TABLET, FILM COATED ORAL at 22:13

## 2018-12-07 RX ADMIN — SODIUM CHLORIDE 3 MILLILITER(S): 9 INJECTION INTRAMUSCULAR; INTRAVENOUS; SUBCUTANEOUS at 22:16

## 2018-12-07 NOTE — CHART NOTE - NSCHARTNOTEFT_GEN_A_CORE
Patient is s/p cardiac cath LAD placement via right radial artery.  Site is stable with no hematoma, active bleed or swelling.  Dressing is clean/dry/intact. RRA pulse is palpable.  Patient complaining of 3/10 chest pain. Will order EKG and will continue to monitor. Patient is s/p cardiac cath LAD placement via right radial artery.  Site is stable with no hematoma, active bleed or swelling.  Dressing is clean/dry/intact. RRA pulse is palpable.  Patient complaining of 3/10 chest pain. EKG showed NSR, unchanged from previous EKGs. Tylenol was ordered.  Cardiac enzymes were ordered, Trops: 49, CK: 88, CK-MB: 5.83. Will continue to monitor.      Was notified around 1:30am that the patient woke up from sleeping and was having 8/10 pain. EKG was done which showed Sinus tachycardia. Pt complaining of palpitations, substernal chest pain 8/10, and epigastric discomfort. Chest pain was non-reproducible. 2 Nitros were given, pain now 2/10. Maalox was given for epigastric discomfort. Cardiac enzymes were ordered, and are trending up from before-> Trops: 96, CK: 173, CK-MB: 13.08. CCU NP was notified. AM cardiac enzymes were ordered, continue to trend. Continuing to monitor.

## 2018-12-08 LAB
BUN SERPL-MCNC: 27 MG/DL — HIGH (ref 7–23)
CALCIUM SERPL-MCNC: 9.3 MG/DL — SIGNIFICANT CHANGE UP (ref 8.4–10.5)
CHLORIDE SERPL-SCNC: 104 MMOL/L — SIGNIFICANT CHANGE UP (ref 98–107)
CK MB BLD-MCNC: 13.08 NG/ML — HIGH (ref 1–6.6)
CK MB BLD-MCNC: 15.55 NG/ML — HIGH (ref 1–6.6)
CK SERPL-CCNC: 168 U/L — SIGNIFICANT CHANGE UP (ref 30–200)
CK SERPL-CCNC: 173 U/L — SIGNIFICANT CHANGE UP (ref 30–200)
CO2 SERPL-SCNC: 23 MMOL/L — SIGNIFICANT CHANGE UP (ref 22–31)
CREAT SERPL-MCNC: 1.25 MG/DL — SIGNIFICANT CHANGE UP (ref 0.5–1.3)
GLUCOSE SERPL-MCNC: 112 MG/DL — HIGH (ref 70–99)
HCT VFR BLD CALC: 42.2 % — SIGNIFICANT CHANGE UP (ref 39–50)
HGB BLD-MCNC: 14.1 G/DL — SIGNIFICANT CHANGE UP (ref 13–17)
MAGNESIUM SERPL-MCNC: 2.1 MG/DL — SIGNIFICANT CHANGE UP (ref 1.6–2.6)
MCHC RBC-ENTMCNC: 30.3 PG — SIGNIFICANT CHANGE UP (ref 27–34)
MCHC RBC-ENTMCNC: 33.4 % — SIGNIFICANT CHANGE UP (ref 32–36)
MCV RBC AUTO: 90.6 FL — SIGNIFICANT CHANGE UP (ref 80–100)
NRBC # FLD: 0 — SIGNIFICANT CHANGE UP
PHOSPHATE SERPL-MCNC: 3.7 MG/DL — SIGNIFICANT CHANGE UP (ref 2.5–4.5)
PLATELET # BLD AUTO: 240 K/UL — SIGNIFICANT CHANGE UP (ref 150–400)
PMV BLD: 9.7 FL — SIGNIFICANT CHANGE UP (ref 7–13)
POTASSIUM SERPL-MCNC: 3.9 MMOL/L — SIGNIFICANT CHANGE UP (ref 3.5–5.3)
POTASSIUM SERPL-SCNC: 3.9 MMOL/L — SIGNIFICANT CHANGE UP (ref 3.5–5.3)
RBC # BLD: 4.66 M/UL — SIGNIFICANT CHANGE UP (ref 4.2–5.8)
RBC # FLD: 12.3 % — SIGNIFICANT CHANGE UP (ref 10.3–14.5)
SODIUM SERPL-SCNC: 140 MMOL/L — SIGNIFICANT CHANGE UP (ref 135–145)
TROPONIN T, HIGH SENSITIVITY: 145 NG/L — CRITICAL HIGH (ref ?–14)
TROPONIN T, HIGH SENSITIVITY: 96 NG/L — CRITICAL HIGH (ref ?–14)
WBC # BLD: 8.75 K/UL — SIGNIFICANT CHANGE UP (ref 3.8–10.5)
WBC # FLD AUTO: 8.75 K/UL — SIGNIFICANT CHANGE UP (ref 3.8–10.5)

## 2018-12-08 PROCEDURE — 93010 ELECTROCARDIOGRAM REPORT: CPT

## 2018-12-08 RX ORDER — METOPROLOL TARTRATE 50 MG
50 TABLET ORAL DAILY
Qty: 0 | Refills: 0 | Status: DISCONTINUED | OUTPATIENT
Start: 2018-12-09 | End: 2018-12-09

## 2018-12-08 RX ORDER — METOPROLOL TARTRATE 50 MG
25 TABLET ORAL ONCE
Qty: 0 | Refills: 0 | Status: COMPLETED | OUTPATIENT
Start: 2018-12-08 | End: 2018-12-08

## 2018-12-08 RX ORDER — NITROGLYCERIN 6.5 MG
0.4 CAPSULE, EXTENDED RELEASE ORAL ONCE
Qty: 0 | Refills: 0 | Status: COMPLETED | OUTPATIENT
Start: 2018-12-08 | End: 2018-12-08

## 2018-12-08 RX ORDER — IBUPROFEN 200 MG
400 TABLET ORAL ONCE
Qty: 0 | Refills: 0 | Status: COMPLETED | OUTPATIENT
Start: 2018-12-08 | End: 2018-12-08

## 2018-12-08 RX ADMIN — Medication 400 MILLIGRAM(S): at 15:12

## 2018-12-08 RX ADMIN — Medication 650 MILLIGRAM(S): at 00:00

## 2018-12-08 RX ADMIN — Medication 400 MILLIGRAM(S): at 16:10

## 2018-12-08 RX ADMIN — SODIUM CHLORIDE 3 MILLILITER(S): 9 INJECTION INTRAMUSCULAR; INTRAVENOUS; SUBCUTANEOUS at 14:40

## 2018-12-08 RX ADMIN — HEPARIN SODIUM 5000 UNIT(S): 5000 INJECTION INTRAVENOUS; SUBCUTANEOUS at 18:39

## 2018-12-08 RX ADMIN — Medication 25 MILLIGRAM(S): at 06:06

## 2018-12-08 RX ADMIN — Medication 25 MILLIGRAM(S): at 18:39

## 2018-12-08 RX ADMIN — Medication 0.4 MILLIGRAM(S): at 01:44

## 2018-12-08 RX ADMIN — ATORVASTATIN CALCIUM 40 MILLIGRAM(S): 80 TABLET, FILM COATED ORAL at 22:09

## 2018-12-08 RX ADMIN — Medication 650 MILLIGRAM(S): at 12:48

## 2018-12-08 RX ADMIN — TICAGRELOR 90 MILLIGRAM(S): 90 TABLET ORAL at 18:39

## 2018-12-08 RX ADMIN — Medication 81 MILLIGRAM(S): at 12:48

## 2018-12-08 RX ADMIN — SODIUM CHLORIDE 3 MILLILITER(S): 9 INJECTION INTRAMUSCULAR; INTRAVENOUS; SUBCUTANEOUS at 06:07

## 2018-12-08 RX ADMIN — SODIUM CHLORIDE 3 MILLILITER(S): 9 INJECTION INTRAMUSCULAR; INTRAVENOUS; SUBCUTANEOUS at 22:05

## 2018-12-08 RX ADMIN — Medication 650 MILLIGRAM(S): at 14:15

## 2018-12-08 RX ADMIN — TICAGRELOR 90 MILLIGRAM(S): 90 TABLET ORAL at 06:06

## 2018-12-08 RX ADMIN — HEPARIN SODIUM 5000 UNIT(S): 5000 INJECTION INTRAVENOUS; SUBCUTANEOUS at 06:06

## 2018-12-08 RX ADMIN — Medication 100 MILLIGRAM(S): at 22:09

## 2018-12-08 RX ADMIN — Medication 30 MILLILITER(S): at 01:54

## 2018-12-08 RX ADMIN — Medication 0.4 MILLIGRAM(S): at 01:57

## 2018-12-08 RX ADMIN — Medication 100 MILLIGRAM(S): at 09:54

## 2018-12-08 NOTE — PROVIDER CONTACT NOTE (CRITICAL VALUE NOTIFICATION) - BACKGROUND
Pt is s/p ccath and complained of chest pain. EKG completed, medication given as per orders and labs were drawn, resulting in Troponin value of 96.

## 2018-12-08 NOTE — PROGRESS NOTE ADULT - SUBJECTIVE AND OBJECTIVE BOX
Subjective/Objective:  Called by PA to assess patient who is s/p LHC with AARON to LAD for possible D/C home today. Patient with chest pain overnight requiring sl NTG X 2 and also c/o HA but this existed prior to transfer. Also with intermittent palpitations though in sinus tachycardia. CKs normal but CKMB and hsT slightly trending up.     MEDICATIONS  (STANDING):  aspirin enteric coated 81 milliGRAM(s) Oral daily  atorvastatin 40 milliGRAM(s) Oral at bedtime  heparin  Injectable 5000 Unit(s) SubCutaneous every 12 hours  hydrALAZINE 100 milliGRAM(s) Oral every 12 hours  sodium chloride 0.9% lock flush 3 milliLiter(s) IV Push every 8 hours  sodium chloride 0.9%. 1000 milliLiter(s) (75 mL/Hr) IV Continuous <Continuous>  ticagrelor 90 milliGRAM(s) Oral two times a day    MEDICATIONS  (PRN):  acetaminophen   Tablet .. 650 milliGRAM(s) Oral every 6 hours PRN Temp greater or equal to 38C (100.4F), Mild Pain (1 - 3), Moderate Pain (4 - 6)  aluminum hydroxide/magnesium hydroxide/simethicone Suspension 30 milliLiter(s) Oral every 6 hours PRN Dyspepsia          Vital Signs Last 24 Hrs  T(C): 36.5 (08 Dec 2018 13:07), Max: 37.2 (07 Dec 2018 20:00)  T(F): 97.7 (08 Dec 2018 13:07), Max: 99 (07 Dec 2018 20:00)  HR: 105 (08 Dec 2018 15:44) (73 - 125)  BP: 133/79 (08 Dec 2018 13:07) (117/68 - 150/91)  BP(mean): --  RR: 18 (08 Dec 2018 13:07) (15 - 18)  SpO2: 98% (08 Dec 2018 13:07) (96% - 100%)  I&O's Detail        PHYSICAL EXAM  GEN: NAD, skin W & D  RESP: CTA ant  CV: nl S1S2  GI: soft, NT/ND  EXT: no C/C/E  NEURO: A & O X 3      EKG/ TELEM: sinus tachycardia with nonspecific STTW changes noted. EKG unchanged from previous.    LABS:                          14.1   8.75  )-----------( 240      ( 08 Dec 2018 06:00 )             42.2       08 Dec 2018 06:00    140    |  104    |  27<H>  ----------------------------<  112<H>  3.9     |  23     |  1.25     07 Dec 2018 18:20    x      |  x      |  x      ----------------------------<  x      4.3     |  x      |  x        Ca    9.3        08 Dec 2018 06:00  Ca    10.0       07 Dec 2018 14:00  Phos  3.7       08 Dec 2018 06:00  Mg     2.1       08 Dec 2018 06:00      CARDIAC MARKERS ( 08 Dec 2018 06:00 )  x     / x     / 168 u/L / 15.55 ng/mL / x      CARDIAC MARKERS ( 08 Dec 2018 01:50 )  x     / x     / 173 u/L / 13.08 ng/mL / x      CARDIAC MARKERS ( 07 Dec 2018 20:40 )  x     / x     / 88 u/L / 5.83 ng/mL / x          Troponin T, High Sensitivity: 145 ng/L (12-08-18 @ 06:00)  Troponin T, High Sensitivity: 96 ng/L (12-08-18 @ 01:50)  Troponin T, High Sensitivity: 49 ng/L (12-07-18 @ 20:40)    Creatine Kinase, Serum: 168 u/L (12-08-18 @ 06:00)  Creatine Kinase, Serum: 173 u/L (12-08-18 @ 01:50)  Creatine Kinase, Serum: 88 u/L (12-07-18 @ 20:40)    CKMB: 15.55 ng/mL (12-08-18 @ 06:00)  CKMB: 13.08 ng/mL (12-08-18 @ 01:50)  CKMB: 5.83 ng/mL (12-07-18 @ 20:40)

## 2018-12-08 NOTE — CHART NOTE - NSCHARTNOTEFT_GEN_A_CORE
Patient presented with HR 130s and nonsustained down to 110 now is in 106.  Will continue to monitor.  He also c/o headache who has not slept for 2 days.  Tylenol did not help this morning.  Will give dose of motrin with food.  BP is stable.  Will continue to monitor  Melani MCKEON

## 2018-12-08 NOTE — PROGRESS NOTE ADULT - SUBJECTIVE AND OBJECTIVE BOX
PRESENTING CC:Chest pain    SUBJ:   58 year old man with past medical history of HTN, HLD, TARUN on CPAP sent to [facility] for cardiac catheterization. Patient has been experiencing palpitations and dizziness for about one week.Had [id number] LAD had episode chest epigastric pain last noght-no ECG changes now resolved.    PMH -reviewed admission note, no change since admission  Heart failure: acute [ ] chronic [ ] acute or chronic [ ] diastolic [ ] systolic [ ] combined systolic and diastolic[ ]  EDDY: ATN[ ] renal medullary necrosis [ ] CKD I [ ]CKDII [ ]CKD III [ ]CKD IV [ ]CKD V [ ]Other pathological lesions [ ]    MEDICATIONS  (STANDING):  aspirin enteric coated 81 milliGRAM(s) Oral daily  atorvastatin 40 milliGRAM(s) Oral at bedtime  heparin  Injectable 5000 Unit(s) SubCutaneous every 12 hours  hydrALAZINE 100 milliGRAM(s) Oral every 12 hours  metoprolol succinate ER 50 milliGRAM(s) Oral daily  sodium chloride 0.9% lock flush 3 milliLiter(s) IV Push every 8 hours  sodium chloride 0.9%. 1000 milliLiter(s) (75 mL/Hr) IV Continuous <Continuous>  ticagrelor 90 milliGRAM(s) Oral two times a day    MEDICATIONS  (PRN):  acetaminophen   Tablet .. 650 milliGRAM(s) Oral every 6 hours PRN Temp greater or equal to 38C (100.4F), Mild Pain (1 - 3), Moderate Pain (4 - 6)  aluminum hydroxide/magnesium hydroxide/simethicone Suspension 30 milliLiter(s) Oral every 6 hours PRN Dyspepsia  sodium chloride 0.65% Nasal 1 Spray(s) Both Nostrils four times a day PRN Nasal Congestion          FAMILY HISTORY:  No pertinent family history in first degree relatives  No family history of premature coronary artery disease or sudden cardiac death      REVIEW OF SYSTEMS:  Constitutional: [ ] fever, [ ]weight loss,  [ ]fatigue  Eyes: [ ] visual changes  Respiratory: [ ]shortness of breath;  [ ] cough, [ ]wheezing, [ ]chills, [ ]hemoptysis  Cardiovascular: x[ ] chest pain, [x ]palpitations, [ ]dizziness,  [ ]leg swelling[ ]orthopnea[ ]PND  Gastrointestinal: [ ] abdominal pain, [ ]nausea, [ ]vomiting,  [ ]diarrhea   Genitourinary: [ ] dysuria, [ ] hematuria  Neurologic: [ ] headaches [ ] tremors[ ]weakness  Skin: [ ] itching, [ ]burning, [ ] rashes  Endocrine: [ ] heat or cold intolerance  Musculoskeletal: [ ] joint pain or swelling; [ ] muscle, back, or extremity pain  Psychiatric: [ ] depression, [ ]anxiety, [ ]mood swings, or [ ]difficulty sleeping  Hematologic: [ ] easy bruising, [ ] bleeding gums    [x] All remaining systems negative except as per above.   [ ]Unable to obtain.    Vital Signs Last 24 Hrs  T(C): 36.5 (08 Dec 2018 13:07), Max: 37.2 (07 Dec 2018 20:00)  T(F): 97.7 (08 Dec 2018 13:07), Max: 99 (07 Dec 2018 20:00)  HR: 105 (08 Dec 2018 15:44) (73 - 125)  BP: 133/79 (08 Dec 2018 13:07) (117/68 - 150/91)  RR: 18 (08 Dec 2018 13:07) (15 - 18)  SpO2: 98% (08 Dec 2018 13:07) (96% - 100%)      PHYSICAL EXAM:  General: No acute distress BMI-38  38  Neck: Supple, [ ] JVD  Lungs: Equal air entry bilaterally; [ ] rales [ ] wheezing [ ] rhonchi  Heart: Regular rate and rhythm; [x] murmur  2 /6 [ x] systolic [ ] diastolic [ ] radiation[ ] rubs [ ]  gallops  Abdomen: Nontender, bowel sounds present  Extremities: No clubbing, cyanosis, [ ] edema  Nervous system:  Alert & Oriented X3, no focal deficits  Psychiatric: Normal affect  Skin: No rashes or lesions    LABS:  12-08    140  |  104  |  27<H>  ----------------------------<  112<H>  3.9   |  23  |  1.25    Ca    9.3      08 Dec 2018 06:00  Phos  3.7     12-08  Mg     2.1     12-08      Creatinine Trend: 1.25<--, 1.55<--, 1.19<--, 0.92<--, 1.18<--                        14.1   8.75  )-----------( 240      ( 08 Dec 2018 06:00 )             42.2       Lipid Panel:   Cardiac Enzymes: CARDIAC MARKERS ( 08 Dec 2018 06:00 )  x     / x     / 168 u/L / 15.55 ng/mL / x      CARDIAC MARKERS ( 08 Dec 2018 01:50 )  x     / x     / 173 u/L / 13.08 ng/mL / x      CARDIAC MARKERS ( 07 Dec 2018 20:40 )  x     / x     / 88 u/L / 5.83 ng/mL / x            CATHETERIZATION:  Study date: 12/07/2018ORONARY VESSELS: The coronary circulation is left dominant.  LM:   --  LM: Angiography showed mild atherosclerosis with no flow limiting lesions.  LAD:   --  Proximal LAD: There was a tubular 20 % stenosis at a site with noprior intervention. The lesion was eccentric. There was BRADY grade 3 flow through the vessel (brisk flow).  --  Mid LAD: There was a tubular 80 % stenosis at a site with no prior intervention. The lesion was eccentric. There was BRADY grade 3 flow  through the vessel (brisk flow). iFR 0.82  --  D1: Angiography showed mild atherosclerosis with no flow limiting lesions.  CX:   --  Circumflex: The vessel was large sized (dominant). Angiography showed mild atherosclerosis with no flow limiting lesions.  --  OM1: Normal.  --  OM2: Normal.  RCA:   --  RCA: The vessel was small (non-dominant).  --  Proximal RCA: There was a tubular 30 % stenosis at a site with no prior intervention. The lesion was eccentric. There was BRADY grade 3 flow through the vessel (brisk flow).  INTERVENTIONAL IMPRESSIONS: Successful PCI to severe stenosis of mid LAD(iFR 0.82) using 3.5mm [place] AARON    IMPRESSION AND PLAN:    58M with HTN, HLD, TARUN on CPAP transferred to [facility] for cardiac cath based on abnormal stress test, s/p AARON to LAD now with post procedure chest discomfort and mildly elevated cardiac enzymes.    Continue DAPT.BP control-Hydralazine Metoprolol  Possible discharge in AM

## 2018-12-08 NOTE — PROGRESS NOTE ADULT - ATTENDING COMMENTS
Patient was seen and examined,interim events noted,labs and radiology studies reviewed.  Rachid Garcia MD,FACC.  [address] [place] [address].  [id number].  [phone]

## 2018-12-08 NOTE — PATIENT PROFILE ADULT - NSPROHMSYMPCOND_GEN_A_NUR
Electrodesiccation Text: The wound bed was treated with electrodesiccation after the biopsy was performed. Billing Type: Third-Party Bill Cryotherapy Text: The wound bed was treated with cryotherapy after the biopsy was performed. Consent: Written consent was obtained and risks were reviewed including but not limited to scarring, infection, bleeding, scabbing, incomplete removal, nerve damage and allergy to anesthesia. Post-Care Instructions: I reviewed with the patient in detail post-care instructions. Patient is to keep the biopsy site dry overnight, in the morning wash with warm soapy water, and then apply Vaseline and a band-aid twice daily until healed. Biopsy Method: Personna blade Depth Of Biopsy: dermis Detail Level: Detailed Size Of Lesion In Cm: 3 Render Post-Care Instructions In Note?: no Hemostasis: Drysol Was A Bandage Applied: Yes Anesthesia Volume In Cc: 0.5 Biopsy Type: H and E X Size Of Lesion In Cm: 2 Type Of Destruction Used: Curettage Dressing: bandage Curettage Text: The wound bed was treated with curettage after the biopsy was performed. Anesthesia Type: 1% lidocaine with epinephrine and a 1:10 solution of 8.4% sodium bicarbonate Electrodesiccation And Curettage Text: The wound bed was treated with electrodesiccation and curettage after the biopsy was performed. Notification Instructions: Patient will be notified of biopsy results. However, patient instructed to call the office if not contacted within 2 weeks. Wound Care: Vaseline Silver Nitrate Text: The wound bed was treated with silver nitrate after the biopsy was performed. Additional Anesthesia Volume In Cc (Will Not Render If 0): 0 cardiovascular/sleep

## 2018-12-08 NOTE — PROGRESS NOTE ADULT - ASSESSMENT
58M with HTN, HLD, TARUN on CPAP transferred to [facility] for cardiac cath based on abnormal stress test, s/p AARON to LAD now with post procedure chest discomfort and mildly elevated cardiac enzymes.    All discussed and reviewed with Dr. Bradshaw, will monitor overnight, trend enzymes and increase Toprol to 50 mg po QD. Check head CT please. Reassess in am for possible discharge. 58M with HTN, HLD, TARUN on CPAP transferred to [facility] for cardiac cath based on abnormal stress test, s/p AARON to LAD now with post procedure chest discomfort and mildly elevated cardiac enzymes.    All discussed and reviewed with Dr. Bradshaw, will monitor overnight, trend enzymes and increase Toprol to 50 mg po QD (please give additional Tprol XL 25 mg now).  Check head CT please. Reassess in am for possible discharge.

## 2018-12-09 VITALS — HEART RATE: 102 BPM

## 2018-12-09 LAB
BASOPHILS # BLD AUTO: 0.07 K/UL — SIGNIFICANT CHANGE UP (ref 0–0.2)
BASOPHILS NFR BLD AUTO: 0.9 % — SIGNIFICANT CHANGE UP (ref 0–2)
BUN SERPL-MCNC: 23 MG/DL — SIGNIFICANT CHANGE UP (ref 7–23)
CALCIUM SERPL-MCNC: 9.5 MG/DL — SIGNIFICANT CHANGE UP (ref 8.4–10.5)
CHLORIDE SERPL-SCNC: 102 MMOL/L — SIGNIFICANT CHANGE UP (ref 98–107)
CO2 SERPL-SCNC: 17 MMOL/L — LOW (ref 22–31)
CREAT SERPL-MCNC: 1.06 MG/DL — SIGNIFICANT CHANGE UP (ref 0.5–1.3)
EOSINOPHIL # BLD AUTO: 0.13 K/UL — SIGNIFICANT CHANGE UP (ref 0–0.5)
EOSINOPHIL NFR BLD AUTO: 1.6 % — SIGNIFICANT CHANGE UP (ref 0–6)
GLUCOSE SERPL-MCNC: 194 MG/DL — HIGH (ref 70–99)
HCT VFR BLD CALC: 44 % — SIGNIFICANT CHANGE UP (ref 39–50)
HGB BLD-MCNC: 14.7 G/DL — SIGNIFICANT CHANGE UP (ref 13–17)
IMM GRANULOCYTES # BLD AUTO: 0.02 # — SIGNIFICANT CHANGE UP
IMM GRANULOCYTES NFR BLD AUTO: 0.2 % — SIGNIFICANT CHANGE UP (ref 0–1.5)
LYMPHOCYTES # BLD AUTO: 2.28 K/UL — SIGNIFICANT CHANGE UP (ref 1–3.3)
LYMPHOCYTES # BLD AUTO: 27.8 % — SIGNIFICANT CHANGE UP (ref 13–44)
MCHC RBC-ENTMCNC: 30.3 PG — SIGNIFICANT CHANGE UP (ref 27–34)
MCHC RBC-ENTMCNC: 33.4 % — SIGNIFICANT CHANGE UP (ref 32–36)
MCV RBC AUTO: 90.7 FL — SIGNIFICANT CHANGE UP (ref 80–100)
MONOCYTES # BLD AUTO: 0.49 K/UL — SIGNIFICANT CHANGE UP (ref 0–0.9)
MONOCYTES NFR BLD AUTO: 6 % — SIGNIFICANT CHANGE UP (ref 2–14)
NEUTROPHILS # BLD AUTO: 5.2 K/UL — SIGNIFICANT CHANGE UP (ref 1.8–7.4)
NEUTROPHILS NFR BLD AUTO: 63.5 % — SIGNIFICANT CHANGE UP (ref 43–77)
NRBC # FLD: 0 — SIGNIFICANT CHANGE UP
PLATELET # BLD AUTO: 240 K/UL — SIGNIFICANT CHANGE UP (ref 150–400)
PMV BLD: 9.5 FL — SIGNIFICANT CHANGE UP (ref 7–13)
POTASSIUM SERPL-MCNC: 4.4 MMOL/L — SIGNIFICANT CHANGE UP (ref 3.5–5.3)
POTASSIUM SERPL-SCNC: 4.4 MMOL/L — SIGNIFICANT CHANGE UP (ref 3.5–5.3)
RBC # BLD: 4.85 M/UL — SIGNIFICANT CHANGE UP (ref 4.2–5.8)
RBC # FLD: 12 % — SIGNIFICANT CHANGE UP (ref 10.3–14.5)
SODIUM SERPL-SCNC: 135 MMOL/L — SIGNIFICANT CHANGE UP (ref 135–145)
WBC # BLD: 8.19 K/UL — SIGNIFICANT CHANGE UP (ref 3.8–10.5)
WBC # FLD AUTO: 8.19 K/UL — SIGNIFICANT CHANGE UP (ref 3.8–10.5)

## 2018-12-09 RX ORDER — LOSARTAN POTASSIUM 100 MG/1
1 TABLET, FILM COATED ORAL
Qty: 0 | Refills: 0 | COMMUNITY

## 2018-12-09 RX ORDER — METOPROLOL TARTRATE 50 MG
1 TABLET ORAL
Qty: 0 | Refills: 0 | COMMUNITY
Start: 2018-12-09

## 2018-12-09 RX ORDER — FLUTICASONE PROPIONATE 50 MCG
50 SPRAY, SUSPENSION NASAL
Qty: 1 | Refills: 0 | OUTPATIENT
Start: 2018-12-09 | End: 2019-01-07

## 2018-12-09 RX ORDER — SIMVASTATIN 20 MG/1
1 TABLET, FILM COATED ORAL
Qty: 0 | Refills: 0 | COMMUNITY

## 2018-12-09 RX ORDER — METOPROLOL TARTRATE 50 MG
1 TABLET ORAL
Qty: 0 | Refills: 0 | COMMUNITY

## 2018-12-09 RX ORDER — SODIUM CHLORIDE 0.65 %
1 AEROSOL, SPRAY (ML) NASAL
Qty: 0 | Refills: 0 | Status: DISCONTINUED | OUTPATIENT
Start: 2018-12-09 | End: 2018-12-09

## 2018-12-09 RX ORDER — ATORVASTATIN CALCIUM 80 MG/1
1 TABLET, FILM COATED ORAL
Qty: 30 | Refills: 0 | OUTPATIENT
Start: 2018-12-09 | End: 2019-01-07

## 2018-12-09 RX ORDER — HYDRALAZINE HCL 50 MG
1 TABLET ORAL
Qty: 0 | Refills: 0 | COMMUNITY
Start: 2018-12-09

## 2018-12-09 RX ORDER — HYDRALAZINE HCL 50 MG
1 TABLET ORAL
Qty: 0 | Refills: 0 | COMMUNITY

## 2018-12-09 RX ORDER — TICAGRELOR 90 MG/1
1 TABLET ORAL
Qty: 60 | Refills: 0 | OUTPATIENT
Start: 2018-12-09 | End: 2019-01-07

## 2018-12-09 RX ADMIN — Medication 81 MILLIGRAM(S): at 11:33

## 2018-12-09 RX ADMIN — Medication 650 MILLIGRAM(S): at 17:31

## 2018-12-09 RX ADMIN — Medication 650 MILLIGRAM(S): at 03:57

## 2018-12-09 RX ADMIN — SODIUM CHLORIDE 3 MILLILITER(S): 9 INJECTION INTRAMUSCULAR; INTRAVENOUS; SUBCUTANEOUS at 05:34

## 2018-12-09 RX ADMIN — Medication 1 SPRAY(S): at 00:35

## 2018-12-09 RX ADMIN — TICAGRELOR 90 MILLIGRAM(S): 90 TABLET ORAL at 17:05

## 2018-12-09 RX ADMIN — Medication 100 MILLIGRAM(S): at 10:25

## 2018-12-09 RX ADMIN — Medication 50 MILLIGRAM(S): at 06:48

## 2018-12-09 RX ADMIN — TICAGRELOR 90 MILLIGRAM(S): 90 TABLET ORAL at 06:45

## 2018-12-09 RX ADMIN — Medication 650 MILLIGRAM(S): at 16:44

## 2018-12-09 RX ADMIN — Medication 650 MILLIGRAM(S): at 05:34

## 2018-12-09 RX ADMIN — HEPARIN SODIUM 5000 UNIT(S): 5000 INJECTION INTRAVENOUS; SUBCUTANEOUS at 06:46

## 2018-12-09 NOTE — PROGRESS NOTE ADULT - SUBJECTIVE AND OBJECTIVE BOX
PRESENTING CC:Chest pain    SUBJ: Refers to nasal congestion no chest pain      PMH -reviewed admission note, no change since admission  Heart failure: acute [ ] chronic [ ] acute or chronic [ ] diastolic [ ] systolic [ ] combined systolic and diastolic[ ]  EDDY: ATN[ ] renal medullary necrosis [ ] CKD I [ ]CKDII [ ]CKD III [ ]CKD IV [ ]CKD V [ ]Other pathological lesions [ ]    MEDICATIONS  (STANDING):  aspirin enteric coated 81 milliGRAM(s) Oral daily  atorvastatin 40 milliGRAM(s) Oral at bedtime  heparin  Injectable 5000 Unit(s) SubCutaneous every 12 hours  hydrALAZINE 100 milliGRAM(s) Oral every 12 hours  metoprolol succinate ER 50 milliGRAM(s) Oral daily  sodium chloride 0.9% lock flush 3 milliLiter(s) IV Push every 8 hours  sodium chloride 0.9%. 1000 milliLiter(s) (75 mL/Hr) IV Continuous <Continuous>  ticagrelor 90 milliGRAM(s) Oral two times a day    MEDICATIONS  (PRN):  acetaminophen   Tablet .. 650 milliGRAM(s) Oral every 6 hours PRN Temp greater or equal to 38C (100.4F), Mild Pain (1 - 3), Moderate Pain (4 - 6)  aluminum hydroxide/magnesium hydroxide/simethicone Suspension 30 milliLiter(s) Oral every 6 hours PRN Dyspepsia  sodium chloride 0.65% Nasal 1 Spray(s) Both Nostrils four times a day PRN Nasal Congestion          FAMILY HISTORY:  No pertinent family history in first degree relatives    No family history of premature coronary artery disease or sudden cardiac death      REVIEW OF SYSTEMS:  Constitutional: [ ] fever, [ ]weight loss,  [ ]fatigue  Eyes: [ ] visual changes  Respiratory: [ ]shortness of breath;  [ ] cough, [ ]wheezing, [ ]chills, [ ]hemoptysis  Cardiovascular: [ ] chest pain, [ ]palpitations, [ ]dizziness,  [ ]leg swelling[ ]orthopnea[ ]PND  Gastrointestinal: [ ] abdominal pain, [ ]nausea, [ ]vomiting,  [ ]diarrhea   Genitourinary: [ ] dysuria, [ ] hematuria  Neurologic: [ ] headaches [ ] tremors[ ]weakness  Skin: [ ] itching, [ ]burning, [ ] rashes  Endocrine: [ ] heat or cold intolerance  Musculoskeletal: [ ] joint pain or swelling; [ ] muscle, back, or extremity pain  Psychiatric: [ ] depression, [ ]anxiety, [ ]mood swings, or [ ]difficulty sleeping  Hematologic: [ ] easy bruising, [ ] bleeding gums    [x] All remaining systems negative except as per above.   [ ]Unable to obtain.    Vital Signs Last 24 Hrs  T(C): 36.7 (09 Dec 2018 05:54), Max: 37.1 (08 Dec 2018 18:43)  T(F): 98 (09 Dec 2018 05:54), Max: 98.7 (08 Dec 2018 18:43)  HR: 88 (09 Dec 2018 07:34) (63 - 115)  BP: 113/68 (09 Dec 2018 05:54) (113/68 - 136/86)  BP(mean): --  RR: 18 (09 Dec 2018 05:54) (18 - 18)  SpO2: 100% (09 Dec 2018 05:54) (96% - 100%)  I&O's Summary      PHYSICAL EXAM:  General: No acute distress BMI-38x  HEENT: EOMI, PERRL  Neck: Supple, [ ] JVD  Lungs: Equal air entry bilaterally; [ ] rales [ ] wheezing [ ] rhonchi  Heart: Regular rate and rhythm; [x ] murmur  2 /6 [x ] systolic [ ] diastolic [ ] radiation[ ] rubs [ ]  gallops  Abdomen: Nontender, bowel sounds present  Extremities: No clubbing, cyanosis, [ ] edema  Nervous system:  Alert & Oriented X3, no focal deficits  Psychiatric: Normal affect  Skin: No rashes or lesions    LABS:  12-08    140  |  104  |  27<H>  ----------------------------<  112<H>  3.9   |  23  |  1.25    Ca    9.3      08 Dec 2018 06:00  Phos  3.7     12-08  Mg     2.1     12-08      Creatinine Trend: 1.25<--, 1.55<--, 1.19<--, 0.92<--, 1.18<--                        14.1   8.75  )-----------( 240      ( 08 Dec 2018 06:00 )             42.2       Lipid Panel:   Cardiac Enzymes: CARDIAC MARKERS ( 08 Dec 2018 06:00 )  x     / x     / 168 u/L / 15.55 ng/mL / x      CARDIAC MARKERS ( 08 Dec 2018 01:50 )  x     / x     / 173 u/L / 13.08 ng/mL / x      CARDIAC MARKERS ( 07 Dec 2018 20:40 )  x     / x     / 88 u/L / 5.83 ng/mL / x            IMPRESSION AND PLAN:    58M with HTN, HLD, TARUN on CPAP transferred to [facility] for cardiac cath based on abnormal stress test, s/p AARON to LAD now with post procedure chest discomfort and mildly elevated cardiac enzymes.    Continue DAPT.BP control-Hydralazine Metoprolol  Discharge today  Headache had CTH last week likely 2/2 sinus congestion-Flonase.

## 2018-12-09 NOTE — DISCHARGE NOTE ADULT - CARE PROVIDER_API CALL
Rachid Garcia), Medicine  Dept Director  [address] [place], [place] [address]  Phone: [phone]  Fax: [phone]

## 2018-12-09 NOTE — DISCHARGE NOTE ADULT - PATIENT PORTAL LINK FT
You can access the [facility] Patient Portal, offered by [facility], by registering with the following website: [email or web address]

## 2018-12-09 NOTE — DISCHARGE NOTE ADULT - MEDICATION SUMMARY - MEDICATIONS TO TAKE
I will START or STAY ON the medications listed below when I get home from the hospital:    Aspir 81 oral delayed release tablet  -- 1 tab(s) by mouth once a day  -- Indication: For CAD    atorvastatin 40 mg oral tablet  -- 1 tab(s) by mouth once a day (at bedtime)  -- Indication: For Cholesterol    Brilinta (ticagrelor) 90 mg oral tablet  -- 1 tab(s) by mouth 2 times a day   -- It is very important that you take or use this exactly as directed.  Do not skip doses or discontinue unless directed by your doctor.  Obtain medical advice before taking any non-prescription drugs as some may affect the action of this medication.    -- Indication: For CAD    metoprolol succinate 50 mg oral tablet, extended release  -- 1 tab(s) by mouth once a day  -- Indication: For CAD    fluticasone 50 mcg/inh nasal spray  -- 50 microgram(s) intranasally once a day   -- For the nose.  It is very important that you take or use this exactly as directed.  Do not skip doses or discontinue unless directed by your doctor.    -- Indication: For nasal Congestion    hydrALAZINE 100 mg oral tablet  -- 1 tab(s) by mouth every 12 hours  -- Indication: For HTN

## 2018-12-09 NOTE — DISCHARGE NOTE ADULT - MEDICATION SUMMARY - MEDICATIONS TO STOP TAKING
I will STOP taking the medications listed below when I get home from the hospital:    simvastatin 10 mg oral tablet  -- 1 tab(s) by mouth once a day (at bedtime)    losartan 50 mg oral tablet  -- 1 tab(s) by mouth 2 times a day

## 2018-12-09 NOTE — DISCHARGE NOTE ADULT - CARE PLAN
Principal Discharge DX:	CAD (coronary artery disease)  Goal:	To be asymptomatic, to reduce risks factors such as hypertension, diabetes and hyperlipidemia to lower the risk of blood clots formation; and to prevent complications of coronary artery disease such as worsening chest pain, heart attack and death.  Assessment and plan of treatment:	Continue aspirin and Brilinta, do not stop unless instructed by your physician.  Continue low salt, fat, cholesterol and carbohydrate diet. Follow up with cardiologist and primary care physician's routine appointment.  Secondary Diagnosis:	S/P angioplasty with stent  Goal:	To be asymptomatic, to reduce risks factors such as hypertension, diabetes and hyperlipidemia to lower the risk of blood clots formation; and to prevent complications of coronary artery disease such as worsening chest pain, heart attack and death.  Assessment and plan of treatment:	Continue aspirin and Brilinta,  do not stop unless instructed by your physician.  Continue low salt, fat, cholesterol and carbohydrate diet. Follow up with cardiologist and primary care physician's routine appointment.  Secondary Diagnosis:	HTN (hypertension)  Goal:	To maintain a normal blood pressure to prevent heart attack, stroke and renal failure.  Assessment and plan of treatment:	Low sodium and fat diet, continue anti-hypertensive medications, and follow up with primary care physician.  Secondary Diagnosis:	EDDY (acute kidney injury)  Goal:	Resolved  Assessment and plan of treatment:	Your Losartan was discontinued, Please Follow up with Your PMD for repeat BMP in 1 week

## 2018-12-09 NOTE — PROGRESS NOTE ADULT - ATTENDING COMMENTS
Patient was seen and examined,interim events noted,labs and radiology studies reviewed.  Rachid Garcia MD,FACC.  [address] [place] [address].  [id number].  [phone]

## 2018-12-09 NOTE — DISCHARGE NOTE ADULT - MEDICATION SUMMARY - MEDICATIONS TO CHANGE
I will SWITCH the dose or number of times a day I take the medications listed below when I get home from the hospital:    metoprolol succinate 25 mg oral capsule, extended release  -- 1 cap(s) by mouth once a day

## 2018-12-09 NOTE — DISCHARGE NOTE ADULT - IF YOU ARE A SMOKER, IT IS IMPORTANT FOR YOUR HEALTH TO STOP SMOKING. PLEASE BE AWARE THAT SECOND HAND SMOKE IS ALSO HARMFUL.
Statement Selected
additional history taking/direct patient care (not related to procedure)/documentation/interpretation of diagnostic studies/consultation with other physicians

## 2018-12-09 NOTE — DISCHARGE NOTE ADULT - PLAN OF CARE
To be asymptomatic, to reduce risks factors such as hypertension, diabetes and hyperlipidemia to lower the risk of blood clots formation; and to prevent complications of coronary artery disease such as worsening chest pain, heart attack and death. Continue aspirin and Brilinta, do not stop unless instructed by your physician.  Continue low salt, fat, cholesterol and carbohydrate diet. Follow up with cardiologist and primary care physician's routine appointment. Continue aspirin and Brilinta,  do not stop unless instructed by your physician.  Continue low salt, fat, cholesterol and carbohydrate diet. Follow up with cardiologist and primary care physician's routine appointment. To maintain a normal blood pressure to prevent heart attack, stroke and renal failure. Low sodium and fat diet, continue anti-hypertensive medications, and follow up with primary care physician. Resolved Your Losartan was discontinued, Please Follow up with Your PMD for repeat BMP in 1 week

## 2018-12-09 NOTE — DISCHARGE NOTE ADULT - HOSPITAL COURSE
58M with HTN, HLD, TARUN on CPAP transferred to [facility] for cardiac cath based on abnormal stress test, s/p AARON to LAD now with post procedure chest discomfort and mildly elevated cardiac enzymes.    Continue DAPT.BP control-Hydralazine Metoprolol  Discharge today  Headache had CTH last week likely 2/2 sinus congestion-Flonase.  Case discussed with attending, Pt is stable for discharge Home.

## 2018-12-10 ENCOUNTER — INPATIENT (INPATIENT)
Facility: HOSPITAL | Age: 58
LOS: 1 days | Discharge: HOME | End: 2018-12-12
Attending: INTERNAL MEDICINE | Admitting: INTERNAL MEDICINE
Payer: COMMERCIAL

## 2018-12-10 VITALS
TEMPERATURE: 98 F | RESPIRATION RATE: 20 BRPM | SYSTOLIC BLOOD PRESSURE: 156 MMHG | HEART RATE: 119 BPM | OXYGEN SATURATION: 99 % | DIASTOLIC BLOOD PRESSURE: 104 MMHG

## 2018-12-10 DIAGNOSIS — E78.5 HYPERLIPIDEMIA, UNSPECIFIED: ICD-10-CM

## 2018-12-10 DIAGNOSIS — I25.10 ATHEROSCLEROTIC HEART DISEASE OF NATIVE CORONARY ARTERY WITHOUT ANGINA PECTORIS: ICD-10-CM

## 2018-12-10 DIAGNOSIS — I10 ESSENTIAL (PRIMARY) HYPERTENSION: ICD-10-CM

## 2018-12-10 DIAGNOSIS — G47.30 SLEEP APNEA, UNSPECIFIED: ICD-10-CM

## 2018-12-10 DIAGNOSIS — R06.02 SHORTNESS OF BREATH: ICD-10-CM

## 2018-12-10 LAB
ALBUMIN SERPL ELPH-MCNC: 4 G/DL — SIGNIFICANT CHANGE UP (ref 3.3–5)
ALP SERPL-CCNC: 75 U/L — SIGNIFICANT CHANGE UP (ref 40–120)
ALT FLD-CCNC: 20 U/L — SIGNIFICANT CHANGE UP (ref 4–41)
APTT BLD: 29.1 SEC — SIGNIFICANT CHANGE UP (ref 27.5–36.3)
AST SERPL-CCNC: 17 U/L — SIGNIFICANT CHANGE UP (ref 4–40)
BASE EXCESS BLDV CALC-SCNC: -1.8 MMOL/L — SIGNIFICANT CHANGE UP
BASOPHILS # BLD AUTO: 0.09 K/UL — SIGNIFICANT CHANGE UP (ref 0–0.2)
BASOPHILS NFR BLD AUTO: 1 % — SIGNIFICANT CHANGE UP (ref 0–2)
BILIRUB SERPL-MCNC: 0.3 MG/DL — SIGNIFICANT CHANGE UP (ref 0.2–1.2)
BLOOD GAS VENOUS - CREATININE: 1.03 MG/DL — SIGNIFICANT CHANGE UP (ref 0.5–1.3)
BUN SERPL-MCNC: 20 MG/DL — SIGNIFICANT CHANGE UP (ref 7–23)
CALCIUM SERPL-MCNC: 9.3 MG/DL — SIGNIFICANT CHANGE UP (ref 8.4–10.5)
CHLORIDE BLDV-SCNC: 107 MMOL/L — SIGNIFICANT CHANGE UP (ref 96–108)
CHLORIDE SERPL-SCNC: 102 MMOL/L — SIGNIFICANT CHANGE UP (ref 98–107)
CK MB BLD-MCNC: 4.08 NG/ML — SIGNIFICANT CHANGE UP (ref 1–6.6)
CK MB BLD-MCNC: SIGNIFICANT CHANGE UP (ref 0–2.5)
CK SERPL-CCNC: 117 U/L — SIGNIFICANT CHANGE UP (ref 30–200)
CO2 SERPL-SCNC: 20 MMOL/L — LOW (ref 22–31)
CREAT SERPL-MCNC: 1.05 MG/DL — SIGNIFICANT CHANGE UP (ref 0.5–1.3)
EOSINOPHIL # BLD AUTO: 0.26 K/UL — SIGNIFICANT CHANGE UP (ref 0–0.5)
EOSINOPHIL NFR BLD AUTO: 2.8 % — SIGNIFICANT CHANGE UP (ref 0–6)
GAS PNL BLDV: 137 MMOL/L — SIGNIFICANT CHANGE UP (ref 136–146)
GLUCOSE BLDV-MCNC: 125 — HIGH (ref 70–99)
GLUCOSE SERPL-MCNC: 114 MG/DL — HIGH (ref 70–99)
HCO3 BLDV-SCNC: 23 MMOL/L — SIGNIFICANT CHANGE UP (ref 20–27)
HCT VFR BLD CALC: 43 % — SIGNIFICANT CHANGE UP (ref 39–50)
HCT VFR BLDV CALC: 45.3 % — SIGNIFICANT CHANGE UP (ref 39–51)
HGB BLD-MCNC: 14.6 G/DL — SIGNIFICANT CHANGE UP (ref 13–17)
HGB BLDV-MCNC: 14.8 G/DL — SIGNIFICANT CHANGE UP (ref 13–17)
IMM GRANULOCYTES # BLD AUTO: 0.03 # — SIGNIFICANT CHANGE UP
IMM GRANULOCYTES NFR BLD AUTO: 0.3 % — SIGNIFICANT CHANGE UP (ref 0–1.5)
INR BLD: 0.97 — SIGNIFICANT CHANGE UP (ref 0.88–1.17)
LACTATE BLDV-MCNC: 1.3 MMOL/L — SIGNIFICANT CHANGE UP (ref 0.5–2)
LYMPHOCYTES # BLD AUTO: 2.72 K/UL — SIGNIFICANT CHANGE UP (ref 1–3.3)
LYMPHOCYTES # BLD AUTO: 29.7 % — SIGNIFICANT CHANGE UP (ref 13–44)
MCHC RBC-ENTMCNC: 30.3 PG — SIGNIFICANT CHANGE UP (ref 27–34)
MCHC RBC-ENTMCNC: 34 % — SIGNIFICANT CHANGE UP (ref 32–36)
MCV RBC AUTO: 89.2 FL — SIGNIFICANT CHANGE UP (ref 80–100)
MONOCYTES # BLD AUTO: 0.79 K/UL — SIGNIFICANT CHANGE UP (ref 0–0.9)
MONOCYTES NFR BLD AUTO: 8.6 % — SIGNIFICANT CHANGE UP (ref 2–14)
NEUTROPHILS # BLD AUTO: 5.27 K/UL — SIGNIFICANT CHANGE UP (ref 1.8–7.4)
NEUTROPHILS NFR BLD AUTO: 57.6 % — SIGNIFICANT CHANGE UP (ref 43–77)
NRBC # FLD: 0 — SIGNIFICANT CHANGE UP
NT-PROBNP SERPL-SCNC: 332.6 PG/ML — SIGNIFICANT CHANGE UP
PCO2 BLDV: 35 MMHG — LOW (ref 41–51)
PH BLDV: 7.42 PH — SIGNIFICANT CHANGE UP (ref 7.32–7.43)
PLATELET # BLD AUTO: 263 K/UL — SIGNIFICANT CHANGE UP (ref 150–400)
PMV BLD: 9.3 FL — SIGNIFICANT CHANGE UP (ref 7–13)
PO2 BLDV: 79 MMHG — HIGH (ref 35–40)
POTASSIUM BLDV-SCNC: 3.6 MMOL/L — SIGNIFICANT CHANGE UP (ref 3.4–4.5)
POTASSIUM SERPL-MCNC: 3.7 MMOL/L — SIGNIFICANT CHANGE UP (ref 3.5–5.3)
POTASSIUM SERPL-SCNC: 3.7 MMOL/L — SIGNIFICANT CHANGE UP (ref 3.5–5.3)
PROT SERPL-MCNC: 7.6 G/DL — SIGNIFICANT CHANGE UP (ref 6–8.3)
PROTHROM AB SERPL-ACNC: 10.7 SEC — SIGNIFICANT CHANGE UP (ref 9.8–13.1)
RBC # BLD: 4.82 M/UL — SIGNIFICANT CHANGE UP (ref 4.2–5.8)
RBC # FLD: 11.9 % — SIGNIFICANT CHANGE UP (ref 10.3–14.5)
SAO2 % BLDV: 95.7 % — HIGH (ref 60–85)
SODIUM SERPL-SCNC: 136 MMOL/L — SIGNIFICANT CHANGE UP (ref 135–145)
TROPONIN T, HIGH SENSITIVITY: 172 NG/L — CRITICAL HIGH (ref ?–14)
TROPONIN T, HIGH SENSITIVITY: 175 NG/L — CRITICAL HIGH (ref ?–14)
WBC # BLD: 9.16 K/UL — SIGNIFICANT CHANGE UP (ref 3.8–10.5)
WBC # FLD AUTO: 9.16 K/UL — SIGNIFICANT CHANGE UP (ref 3.8–10.5)

## 2018-12-10 PROCEDURE — 71275 CT ANGIOGRAPHY CHEST: CPT | Mod: 26

## 2018-12-10 PROCEDURE — 71045 X-RAY EXAM CHEST 1 VIEW: CPT | Mod: 26

## 2018-12-10 RX ORDER — ASPIRIN/CALCIUM CARB/MAGNESIUM 324 MG
81 TABLET ORAL DAILY
Qty: 0 | Refills: 0 | Status: DISCONTINUED | OUTPATIENT
Start: 2018-12-10 | End: 2018-12-12

## 2018-12-10 RX ORDER — ATORVASTATIN CALCIUM 80 MG/1
40 TABLET, FILM COATED ORAL AT BEDTIME
Qty: 0 | Refills: 0 | Status: DISCONTINUED | OUTPATIENT
Start: 2018-12-10 | End: 2018-12-12

## 2018-12-10 RX ORDER — METOPROLOL TARTRATE 50 MG
50 TABLET ORAL DAILY
Qty: 0 | Refills: 0 | Status: DISCONTINUED | OUTPATIENT
Start: 2018-12-10 | End: 2018-12-12

## 2018-12-10 RX ORDER — FLUTICASONE PROPIONATE 50 MCG
2 SPRAY, SUSPENSION NASAL DAILY
Qty: 0 | Refills: 0 | Status: DISCONTINUED | OUTPATIENT
Start: 2018-12-10 | End: 2018-12-12

## 2018-12-10 RX ORDER — SODIUM CHLORIDE 0.65 %
1 AEROSOL, SPRAY (ML) NASAL
Qty: 0 | Refills: 0 | Status: DISCONTINUED | OUTPATIENT
Start: 2018-12-10 | End: 2018-12-12

## 2018-12-10 RX ORDER — CLOPIDOGREL BISULFATE 75 MG/1
75 TABLET, FILM COATED ORAL DAILY
Qty: 0 | Refills: 0 | Status: DISCONTINUED | OUTPATIENT
Start: 2018-12-10 | End: 2018-12-12

## 2018-12-10 RX ORDER — ENOXAPARIN SODIUM 100 MG/ML
40 INJECTION SUBCUTANEOUS EVERY 24 HOURS
Qty: 0 | Refills: 0 | Status: DISCONTINUED | OUTPATIENT
Start: 2018-12-10 | End: 2018-12-12

## 2018-12-10 RX ORDER — HYDRALAZINE HCL 50 MG
100 TABLET ORAL EVERY 12 HOURS
Qty: 0 | Refills: 0 | Status: DISCONTINUED | OUTPATIENT
Start: 2018-12-10 | End: 2018-12-12

## 2018-12-10 RX ORDER — DIPHENHYDRAMINE HCL 50 MG
25 CAPSULE ORAL ONCE
Qty: 0 | Refills: 0 | Status: COMPLETED | OUTPATIENT
Start: 2018-12-10 | End: 2018-12-10

## 2018-12-10 RX ADMIN — Medication 50 MILLIGRAM(S): at 12:36

## 2018-12-10 RX ADMIN — ATORVASTATIN CALCIUM 40 MILLIGRAM(S): 80 TABLET, FILM COATED ORAL at 22:30

## 2018-12-10 RX ADMIN — Medication 81 MILLIGRAM(S): at 12:35

## 2018-12-10 RX ADMIN — Medication 25 MILLIGRAM(S): at 04:47

## 2018-12-10 RX ADMIN — ENOXAPARIN SODIUM 40 MILLIGRAM(S): 100 INJECTION SUBCUTANEOUS at 12:36

## 2018-12-10 RX ADMIN — Medication 1 SPRAY(S): at 18:03

## 2018-12-10 RX ADMIN — CLOPIDOGREL BISULFATE 75 MILLIGRAM(S): 75 TABLET, FILM COATED ORAL at 12:36

## 2018-12-10 NOTE — ED PROVIDER NOTE - ATTENDING CONTRIBUTION TO CARE
agree with resident note  "58M s/p [facility] with AARON to LAD on 12/7/18 w/ Dr. Holly Bradshaw, TARUN on CPAP, HTN presents with acute shortness of breath while trying to sleep."  Pt has a prolonged course of tachycardia which led to many hospital visits with normal stress, cath which led to stent.  Now stating he cant sleep at night because he wakes up gasping for breath.    PE: persistently tachycardia, afebrile, CTAB/L; s1 s2 no m/r/g abd soft/NT/ND ext: no edema    Imp: uptrending troponin; persistently tachycardic; EKG non ischemia; unclear if this is orthopnea or anxiety.  will admit

## 2018-12-10 NOTE — ED PROVIDER NOTE - PROGRESS NOTE DETAILS
Gordo DENG: Pt with uptrending troponin and elevated proBNP with persistent tachycardia. CK/CKMB okay. D/w Dr. Garcia who had been taking care of the patient up until yesterday, and recommends bringing the patient in to the hospital again for work up.

## 2018-12-10 NOTE — H&P ADULT - PMH
Coronary artery disease  s/p AARON to LAD on 12/7/2018  HLD (hyperlipidemia)    HTN (hypertension)    Sleep apnea

## 2018-12-10 NOTE — ED PROVIDER NOTE - NS ED ROS FT
REVIEW OF SYSTEMS:    Constitutional:     [ ] negative [ -] fevers [ -] chills [ ] weight loss [ ] weight gain  HEENT:                  [ ] negative [ ] dry eyes [ ] eye irritation [ ] postnasal drip [ ] nasal congestion  CV:                         [ ] negative  [- ] chest pain [+ ] orthopnea [ -] palpitations [ ] murmur  Resp:                     [ ] negative [ -] cough [+ ] shortness of breath [ +] dyspnea [ -] wheezing [- ] sputum [- ] hemoptysis  GI:                          [ ] negative [ -] nausea [ -] vomiting [- ] diarrhea [ ] constipation [ ] abd pain [ ] dysphagia   :                        [ ] negative [ ] dysuria [ ] nocturia [ ] hematuria [ ] increased urinary frequency  Musculoskeletal: [ ] negative [ ] back pain [ ] myalgias [ ] arthralgias [ ] fracture  Skin:                       [ ] negative [ -] rash [ ] itch  Neurological:        [ ] negative [- ] headache [ ] dizziness [ ] syncope [ ] weakness [ ] numbness  Psychiatric:           [ ] negative [ ] anxiety [ ] depression  Endocrine:            [ ] negative [ -] diabetes [ ] thyroid problem  Heme/Lymph:      [ ] negative [ ] anemia [ ] bleeding problem  Allergic/Immune: [ ] negative [ ] itchy eyes [ ] nasal discharge [ ] hives [ ] angioedema    [x ] All other systems negative  [ ] Unable to assess ROS because ________.

## 2018-12-10 NOTE — H&P ADULT - PROBLEM SELECTOR PLAN 1
Admitted to tele  S/P Cath on 12/7 with AARON to LAD  Started on Hydralazine and Brilinta on 12/7 - same day as onset of SOB  Symptoms may be due to adenosine effect of brilinta - will switch to Plavix  Also, sinus mucosal thickening on CTA of head on prior admission - will continue Flonase and add Nasal Saline spray, if symptoms don't improve after switching to Plavix then ENT consult

## 2018-12-10 NOTE — H&P ADULT - NSHPREVIEWOFSYSTEMS_GEN_ALL_CORE
Denies fever, chills, abd pain, nausea, vomiting, chest pain, diarrhea, constipation, brbpr, melena, dysuria, hematuria, visual change, syncope, seizure, falls.

## 2018-12-10 NOTE — H&P ADULT - NSHPLABSRESULTS_GEN_ALL_CORE
14.6   9.16  )-----------( 263      ( 10 Dec 2018 02:10 )             43.0     12-10    136  |  102  |  20  ----------------------------<  114<H>  3.7   |  20<L>  |  1.05    Ca    9.3      10 Dec 2018 02:10    TPro  7.6  /  Alb  4.0  /  TBili  0.3  /  DBili  x   /  AST  17  /  ALT  20  /  AlkPhos  75  12-10      Trop 175 > 172    CXR Clear

## 2018-12-10 NOTE — ED PROVIDER NOTE - OBJECTIVE STATEMENT
58M s/p [facility] with AARON to LAD on 12/7/18 w/ Dr. Holly Bradshaw, TARUN on CPAP, HTN presents with acute shortness of breath while trying to sleep. Pt was discharged 12 hours PTA in the ED. He states he's been having symptoms of waking up not able to breath the last three nights, and was given nasal spray while in the hospital. The patient has been using his CPAP and taking his medications as instructed. He also endorses some nasal congestion. Denies active chest pain, nausea    Cards: Rachid Garcia

## 2018-12-10 NOTE — ED PROVIDER NOTE - PHYSICAL EXAMINATION
General: WN/WD NAD  HEENT: PERRLA, EOMI, moist mucous membranes  Neurology: A&Ox3, nonfocal, VIRGEN x 4  Respiratory: CTA B/L, normal respiratory effort, no wheezes, crackles, rales  CV: Reg rhythm, tachycardic, S1S2, no murmurs, rubs or gallops  Abdominal: Soft, NT, ND +BS, Last BM  Extremities: No edema, + peripheral pulses  Incisions: none  Tubes: none

## 2018-12-10 NOTE — CONSULT NOTE ADULT - SUBJECTIVE AND OBJECTIVE BOX
Reason for Admission: Shortness of breath	  History of Present Illness: 	  78 year old male pmh of HTN, HLD, Sleep apnea and CAD with AARON to LAD on 12/7/2018 who presents with shortness of breath.  Patient states symptoms started on 12/7, get worse after taking his medications, he feels particularly notes after taking doses of hydralazine.  On same day patient was started on Brilinta.  He has been taking his medications as prescribed and uses his CPAP mask every night.  He denies any other complaints. Denies fever, chills, abd pain, nausea, vomiting, chest pain, diarrhea, constipation, brbpr, melena, dysuria, hematuria, visual change, syncope, seizure, falls.     Review of Systems:  Review of Systems: Denies fever, chills, abd pain, nausea, vomiting, chest pain, diarrhea, constipation, brbpr, melena, dysuria, hematuria, visual change, syncope, seizure, falls.	      Allergies and Intolerances:        Allergies:  	No Known Allergies:     Home Medications:   * Patient Currently Takes Medications as of 09-Dec-2018 12:50 documented in Structured Notes  · 	hydrALAZINE 100 mg oral tablet: 1 tab(s) orally every 12 hours  · 	metoprolol succinate 50 mg oral tablet, extended release: 1 tab(s) orally once a day  · 	atorvastatin 40 mg oral tablet: 1 tab(s) orally once a day (at bedtime)  · 	fluticasone 50 mcg/inh nasal spray: 50 microgram(s) intranasally once a day   · 	Brilinta (ticagrelor) 90 mg oral tablet: 1 tab(s) orally 2 times a day   · 	Aspir 81 oral delayed release tablet: 1 tab(s) orally once a day    .    Patient History:   Past Medical History:  Coronary artery disease  s/p AARON to LAD on 12/7/2018  HLD (hyperlipidemia)    HTN (hypertension)    Sleep apnea.    Past Surgical History:  No significant past surgical history.    Family History:  No pertinent family history in first degree relatives.    Tobacco Screening:  · Core Measure Site	No	    Risk Assessment:   Present on Admission:  Deep Venous Thrombosis	no	  Pulmonary Embolus	no	  Urinary Catheter	no	  Central Venous Catheter/PICC Line	no	  Surgical Site Incision	no	  Pressure Ulcer(s)	no	    Heart Failure:  Does this patient have a history of or has been diagnosed with heart failure? no.    HIV Screen (per [facility], HIV screening must be offered to every individual between ages 13 and 64)	Offered and patient declined	  Hepatitis C Screen (per [facility], hepatitis C screening must be offered to every individual born between 1945 and 1965)	Offered and patient declined	      Physical Exam:  Physical Exam: PHYSICAL EXAM:  GENERAL: NAD, well-developed  HEAD:  Atraumatic, Normocephalic  EYES: EOMI, PERRLA, conjunctiva and sclera clear  NECK: Supple, No JVD  CHEST/LUNG: Clear to auscultation bilaterally; No wheeze  HEART: Regular rate and rhythm; No murmurs, rubs, or gallops  ABDOMEN: Soft, Nontender, Nondistended; Bowel sounds present  EXTREMITIES:  2+ Peripheral Pulses, No clubbing, cyanosis, or edema  PSYCH: AAOx3  NEUROLOGY: non-focal SKIN: No rashes or lesions	      Labs and Results:  Labs, Radiology, Cardiology, and Other Results: 14.6   9.16  )-----------( 263      ( 10 Dec 2018 02:10 )             43.0    12-10   136  |  102  |  20  ----------------------------<  114<H>  3.7   |  20<L>  |  1.05   Ca    9.3      10 Dec 2018 02:10   TPro  7.6  /  Alb  4.0  /  TBili  0.3  /  DBili  x   /  AST  17  /  ALT  20  /  AlkPhos  75  12-10    Trop 175 > 172   CXR Clear	    Assessment and Plan:   Assessment:  · Assessment		  78 year old male pmh of HTN, HLD, Sleep apnea and CAD with AARON to LAD on 12/7/2018 who presents with shortness of breath possible caused by side effect from Brilinta.     Problem/Plan - 1:  ·  Problem: Shortness of breath.  Plan: Admitted to tele  S/P Cath on 12/7 with AARON to LAD  Started on Hydralazine and Brilinta on 12/7 - same day as onset of SOB  Symptoms may be due to adenosine effect of brilinta - will switch to Plavix  Also, sinus mucosal thickening on CTA of head on prior admission - will continue Flonase and add Nasal Saline spray, if symptoms don't improve after switching to Plavix then ENT consult.     Problem/Plan - 2:  ·  Problem: Coronary artery disease involving native coronary artery of native heart without angina pectoris.  Plan: DAPT, Statin.     Problem/Plan - 3:  ·  Problem: Essential hypertension.  Plan: Continue Toprol and Hydralazine  Consider switching Hydralzine if continued headache/SOB despite stopping Brilinta.     Problem/Plan - 4:  ·  Problem: Hyperlipidemia, unspecified hyperlipidemia type.  Plan: Statin.     Problem/Plan - 5:  ·  Problem: Sleep apnea.  Plan: Patient uses CPAP at home,

## 2018-12-10 NOTE — ED ADULT NURSE NOTE - OBJECTIVE STATEMENT
pt on bed aox3 c/o Shortness of breath at rest worse with exertion, Also reports chest pain on breathing, headache, lightheaded, feeling like passing out when standing. s/p Cardiac Stent placement 2 days ago, D/C last night, on cm sinus tachy MD at bedside eval the pt. will monitor PMhx HTN HLD

## 2018-12-10 NOTE — ED ADULT NURSE NOTE - CHIEF COMPLAINT QUOTE
Pt. presents to [facility] ED for shortness of breath. Had cardiac stent placed 2 days ago at [facility]. Lungs CTA. NAD noted. no

## 2018-12-10 NOTE — H&P ADULT - ASSESSMENT
78 year old male pmh of HTN, HLD, Sleep apnea and CAD with AARON to LAD on 12/7/2018 who presents with shortness of breath possible caused by side effect from Brilinta.

## 2018-12-10 NOTE — H&P ADULT - HISTORY OF PRESENT ILLNESS
78 year old male pmh of HTN, HLD, Sleep apnea and CAD with AARON to LAD on 12/7/2018 who presents with shortness of breath.  Patient states symptoms started on 12/7, get worse after taking his medications, he feels particularly notes after taking doses of hydralazine.  On same day patient was started on Brilinta.  He has been taking his medications as prescribed and uses his CPAP mask every night.  He denies any other complaints. Denies fever, chills, abd pain, nausea, vomiting, chest pain, diarrhea, constipation, brbpr, melena, dysuria, hematuria, visual change, syncope, seizure, falls.

## 2018-12-10 NOTE — H&P ADULT - PROBLEM SELECTOR PLAN 3
Continue Toprol and Hydralazine  Consider switching Hydralzine if continued headache/SOB despite stopping Brilinta.

## 2018-12-10 NOTE — ED ADULT TRIAGE NOTE - CHIEF COMPLAINT QUOTE
Pt. presents to [facility] ED for shortness of breath. Had cardiac stent placed 2 days ago at [facility]. Lungs CTA. NAD noted.

## 2018-12-10 NOTE — ED PROVIDER NOTE - MEDICAL DECISION MAKING DETAILS
58M w/ recent stent placement with progressive orthopnea concerning for worsening cardiac function, stent thrombosis. EKG unchanged from prior. WIll recheck cardiac labs, chest xray. Will d/w cardiology team re: admission.

## 2018-12-11 LAB
BUN SERPL-MCNC: 23 MG/DL — SIGNIFICANT CHANGE UP (ref 7–23)
CALCIUM SERPL-MCNC: 9 MG/DL — SIGNIFICANT CHANGE UP (ref 8.4–10.5)
CHLORIDE SERPL-SCNC: 103 MMOL/L — SIGNIFICANT CHANGE UP (ref 98–107)
CO2 SERPL-SCNC: 23 MMOL/L — SIGNIFICANT CHANGE UP (ref 22–31)
CREAT SERPL-MCNC: 1.14 MG/DL — SIGNIFICANT CHANGE UP (ref 0.5–1.3)
GLUCOSE SERPL-MCNC: 103 MG/DL — HIGH (ref 70–99)
HCT VFR BLD CALC: 38.7 % — LOW (ref 39–50)
HGB BLD-MCNC: 13 G/DL — SIGNIFICANT CHANGE UP (ref 13–17)
MAGNESIUM SERPL-MCNC: 1.9 MG/DL — SIGNIFICANT CHANGE UP (ref 1.6–2.6)
MCHC RBC-ENTMCNC: 30.1 PG — SIGNIFICANT CHANGE UP (ref 27–34)
MCHC RBC-ENTMCNC: 33.6 % — SIGNIFICANT CHANGE UP (ref 32–36)
MCV RBC AUTO: 89.6 FL — SIGNIFICANT CHANGE UP (ref 80–100)
NRBC # FLD: 0 — SIGNIFICANT CHANGE UP
PLATELET # BLD AUTO: 238 K/UL — SIGNIFICANT CHANGE UP (ref 150–400)
PMV BLD: 9.8 FL — SIGNIFICANT CHANGE UP (ref 7–13)
POTASSIUM SERPL-MCNC: 4.1 MMOL/L — SIGNIFICANT CHANGE UP (ref 3.5–5.3)
POTASSIUM SERPL-SCNC: 4.1 MMOL/L — SIGNIFICANT CHANGE UP (ref 3.5–5.3)
RBC # BLD: 4.32 M/UL — SIGNIFICANT CHANGE UP (ref 4.2–5.8)
RBC # FLD: 11.9 % — SIGNIFICANT CHANGE UP (ref 10.3–14.5)
SODIUM SERPL-SCNC: 138 MMOL/L — SIGNIFICANT CHANGE UP (ref 135–145)
WBC # BLD: 8.43 K/UL — SIGNIFICANT CHANGE UP (ref 3.8–10.5)
WBC # FLD AUTO: 8.43 K/UL — SIGNIFICANT CHANGE UP (ref 3.8–10.5)

## 2018-12-11 RX ORDER — LORATADINE 10 MG/1
10 TABLET ORAL DAILY
Qty: 0 | Refills: 0 | Status: DISCONTINUED | OUTPATIENT
Start: 2018-12-11 | End: 2018-12-12

## 2018-12-11 RX ORDER — CLOPIDOGREL BISULFATE 75 MG/1
1 TABLET, FILM COATED ORAL
Qty: 30 | Refills: 0 | OUTPATIENT
Start: 2018-12-11 | End: 2019-01-09

## 2018-12-11 RX ORDER — ACETAMINOPHEN 500 MG
650 TABLET ORAL ONCE
Qty: 0 | Refills: 0 | Status: COMPLETED | OUTPATIENT
Start: 2018-12-11 | End: 2018-12-11

## 2018-12-11 RX ORDER — METOPROLOL TARTRATE 50 MG
1 TABLET ORAL
Qty: 60 | Refills: 0 | OUTPATIENT
Start: 2018-12-11 | End: 2019-01-09

## 2018-12-11 RX ADMIN — Medication 81 MILLIGRAM(S): at 11:01

## 2018-12-11 RX ADMIN — Medication 1 SPRAY(S): at 06:17

## 2018-12-11 RX ADMIN — CLOPIDOGREL BISULFATE 75 MILLIGRAM(S): 75 TABLET, FILM COATED ORAL at 11:01

## 2018-12-11 RX ADMIN — LORATADINE 10 MILLIGRAM(S): 10 TABLET ORAL at 22:40

## 2018-12-11 RX ADMIN — ENOXAPARIN SODIUM 40 MILLIGRAM(S): 100 INJECTION SUBCUTANEOUS at 11:43

## 2018-12-11 RX ADMIN — Medication 100 MILLIGRAM(S): at 20:18

## 2018-12-11 RX ADMIN — Medication 50 MILLIGRAM(S): at 06:17

## 2018-12-11 RX ADMIN — ATORVASTATIN CALCIUM 40 MILLIGRAM(S): 80 TABLET, FILM COATED ORAL at 21:17

## 2018-12-11 NOTE — DISCHARGE NOTE ADULT - MEDICATION SUMMARY - MEDICATIONS TO TAKE
I will START or STAY ON the medications listed below when I get home from the hospital:    Aspir 81 oral delayed release tablet  -- 1 tab(s) by mouth once a day  -- Indication: For Coronary artery disease    atorvastatin 40 mg oral tablet  -- 1 tab(s) by mouth once a day (at bedtime)  -- Indication: For Coronary artery disease    clopidogrel 75 mg oral tablet  -- 1 tab(s) by mouth once a day  -- Indication: For Coronary artery disease    metoprolol succinate 50 mg oral tablet, extended release  -- 1 tab(s) by mouth 2 times a day   -- Indication: For HTN    fluticasone 50 mcg/inh nasal spray  -- 50 microgram(s) intranasally once a day   -- For the nose.  It is very important that you take or use this exactly as directed.  Do not skip doses or discontinue unless directed by your doctor.    -- Indication: For Congestion

## 2018-12-11 NOTE — DISCHARGE NOTE ADULT - CARE PROVIDER_API CALL
Rachid Garcia), Medicine  Dept Director  [address] [place], [place] [address]  Phone: [phone]  Fax: [phone]

## 2018-12-11 NOTE — DISCHARGE NOTE ADULT - PATIENT PORTAL LINK FT
You can access the [facility], offered by [facility], by registering with the following website: [email or web address]

## 2018-12-11 NOTE — DISCHARGE NOTE ADULT - CARE PLAN
Principal Discharge DX:	Coronary artery disease involving native coronary artery of native heart without angina pectoris  Goal:	continue medications  Assessment and plan of treatment:	discontinue hydralazine and brilinta  continue metoprolol/ASA/Statin/Plavix  Secondary Diagnosis:	HTN (hypertension)  Assessment and plan of treatment:	discontinue hydralazine and brilinta  continue metoprolol/ASA/Statin/Plavix

## 2018-12-11 NOTE — DISCHARGE NOTE ADULT - MEDICATION SUMMARY - MEDICATIONS TO STOP TAKING
I will STOP taking the medications listed below when I get home from the hospital:    Brilinta (ticagrelor) 90 mg oral tablet  -- 1 tab(s) by mouth 2 times a day   -- It is very important that you take or use this exactly as directed.  Do not skip doses or discontinue unless directed by your doctor.  Obtain medical advice before taking any non-prescription drugs as some may affect the action of this medication.    metoprolol succinate 50 mg oral tablet, extended release  -- 1 tab(s) by mouth once a day    hydrALAZINE 100 mg oral tablet  -- 1 tab(s) by mouth every 12 hours

## 2018-12-11 NOTE — DISCHARGE NOTE ADULT - HOSPITAL COURSE
8 year old male pmh of HTN, HLD, Sleep apnea and CAD with AARON to LAD on 12/7/2018 who presents with shortness of breath possible caused by side effect from Brilinta.     Problem/Plan - 1:  ·  Problem: Shortness of breath.  Plan: Admitted to tele  S/P Cath on 12/7 with AARON to LAD  Started on Hydralazine and Brilinta on 12/7 - same day as onset of SOB  Symptoms may be due to adenosine effect of brilinta - will switch to Plavix  Also, sinus mucosal thickening on CTA of head on prior admission - will continue Flonase and add Nasal Saline spray, if symptoms don't improve after switching to Plavix then ENT consult.     Problem/Plan - 2:  ·  Problem: Coronary artery disease involving native coronary artery of native heart without angina pectoris.  Plan: DAPT, Statin.     Problem/Plan - 3:  ·  Problem: Essential hypertension.  Plan: Continue Toprol/ ASA/Statin  Discontinue Hydralazine     Problem/Plan - 4:  ·  Problem: Hyperlipidemia, unspecified hyperlipidemia type.  Plan: Statin.     Problem/Plan - 5:  ·  Problem: Sleep apnea.  Plan: Patient uses CPAP at home,      12/11: Patient stable for discharge as per Dr. Garcia

## 2018-12-12 VITALS
DIASTOLIC BLOOD PRESSURE: 95 MMHG | HEART RATE: 88 BPM | OXYGEN SATURATION: 99 % | RESPIRATION RATE: 18 BRPM | TEMPERATURE: 99 F | SYSTOLIC BLOOD PRESSURE: 156 MMHG

## 2018-12-12 LAB
BUN SERPL-MCNC: 22 MG/DL — SIGNIFICANT CHANGE UP (ref 7–23)
CALCIUM SERPL-MCNC: 9 MG/DL — SIGNIFICANT CHANGE UP (ref 8.4–10.5)
CHLORIDE SERPL-SCNC: 104 MMOL/L — SIGNIFICANT CHANGE UP (ref 98–107)
CO2 SERPL-SCNC: 22 MMOL/L — SIGNIFICANT CHANGE UP (ref 22–31)
CREAT SERPL-MCNC: 1.03 MG/DL — SIGNIFICANT CHANGE UP (ref 0.5–1.3)
GLUCOSE SERPL-MCNC: 100 MG/DL — HIGH (ref 70–99)
HCT VFR BLD CALC: 37.5 % — LOW (ref 39–50)
HGB BLD-MCNC: 12.8 G/DL — LOW (ref 13–17)
MAGNESIUM SERPL-MCNC: 2 MG/DL — SIGNIFICANT CHANGE UP (ref 1.6–2.6)
MCHC RBC-ENTMCNC: 29.7 PG — SIGNIFICANT CHANGE UP (ref 27–34)
MCHC RBC-ENTMCNC: 34.1 % — SIGNIFICANT CHANGE UP (ref 32–36)
MCV RBC AUTO: 87 FL — SIGNIFICANT CHANGE UP (ref 80–100)
NRBC # FLD: 0 — SIGNIFICANT CHANGE UP
PLATELET # BLD AUTO: 240 K/UL — SIGNIFICANT CHANGE UP (ref 150–400)
PMV BLD: 9.7 FL — SIGNIFICANT CHANGE UP (ref 7–13)
POTASSIUM SERPL-MCNC: 4.3 MMOL/L — SIGNIFICANT CHANGE UP (ref 3.5–5.3)
POTASSIUM SERPL-SCNC: 4.3 MMOL/L — SIGNIFICANT CHANGE UP (ref 3.5–5.3)
RBC # BLD: 4.31 M/UL — SIGNIFICANT CHANGE UP (ref 4.2–5.8)
RBC # FLD: 11.9 % — SIGNIFICANT CHANGE UP (ref 10.3–14.5)
SODIUM SERPL-SCNC: 138 MMOL/L — SIGNIFICANT CHANGE UP (ref 135–145)
WBC # BLD: 7.71 K/UL — SIGNIFICANT CHANGE UP (ref 3.8–10.5)
WBC # FLD AUTO: 7.71 K/UL — SIGNIFICANT CHANGE UP (ref 3.8–10.5)

## 2018-12-12 RX ORDER — METOPROLOL TARTRATE 50 MG
50 TABLET ORAL
Qty: 0 | Refills: 0 | Status: DISCONTINUED | OUTPATIENT
Start: 2018-12-12 | End: 2018-12-12

## 2018-12-12 RX ORDER — LORATADINE 10 MG/1
1 TABLET ORAL
Qty: 30 | Refills: 0 | OUTPATIENT
Start: 2018-12-12 | End: 2019-01-10

## 2018-12-12 RX ADMIN — Medication 650 MILLIGRAM(S): at 02:00

## 2018-12-12 RX ADMIN — LORATADINE 10 MILLIGRAM(S): 10 TABLET ORAL at 13:37

## 2018-12-12 RX ADMIN — CLOPIDOGREL BISULFATE 75 MILLIGRAM(S): 75 TABLET, FILM COATED ORAL at 13:30

## 2018-12-12 RX ADMIN — Medication 50 MILLIGRAM(S): at 17:19

## 2018-12-12 RX ADMIN — Medication 1 SPRAY(S): at 17:19

## 2018-12-12 RX ADMIN — Medication 50 MILLIGRAM(S): at 06:39

## 2018-12-12 RX ADMIN — Medication 650 MILLIGRAM(S): at 01:11

## 2018-12-12 RX ADMIN — Medication 81 MILLIGRAM(S): at 13:25

## 2018-12-12 RX ADMIN — Medication 2 SPRAY(S): at 13:31

## 2018-12-12 RX ADMIN — ENOXAPARIN SODIUM 40 MILLIGRAM(S): 100 INJECTION SUBCUTANEOUS at 13:23

## 2018-12-12 NOTE — PROGRESS NOTE ADULT - ASSESSMENT
78 year old male pmh of HTN, HLD, Sleep apnea and CAD with AARON to LAD on 12/7/2018 who presents with shortness of breath possible caused by side effect from Brilinta.
78 year old male pmh of HTN, HLD, Sleep apnea and CAD with AARON to LAD on 12/7/2018 who presents with shortness of breath possible caused by side effect from Brilinta.

## 2018-12-12 NOTE — PROGRESS NOTE ADULT - PROBLEM SELECTOR PLAN 3
Continue Toprol and Hydralazine  Consider switching Hydralzine if continued headache/SOB despite stopping Brilinta.
Continue Toprol and Hydralazine  Consider switching Hydralzine if continued headache/SOB despite stopping Brilinta.

## 2018-12-12 NOTE — PROGRESS NOTE ADULT - SUBJECTIVE AND OBJECTIVE BOX
SUBJECTIVE / OVERNIGHT EVENTS: pt denies chest pain,,concerned about his nasal congestin / difficulty breathing , no stridor      MEDICATIONS  (STANDING):  aspirin enteric coated 81 milliGRAM(s) Oral daily  atorvastatin 40 milliGRAM(s) Oral at bedtime  clopidogrel Tablet 75 milliGRAM(s) Oral daily  enoxaparin Injectable 40 milliGRAM(s) SubCutaneous every 24 hours  fluticasone propionate 50 MICROgram(s)/spray Nasal Spray 2 Spray(s) Both Nostrils daily  hydrALAZINE 100 milliGRAM(s) Oral every 12 hours  metoprolol succinate ER 50 milliGRAM(s) Oral daily  sodium chloride 0.65% Nasal 1 Spray(s) Both Nostrils two times a day    MEDICATIONS  (PRN):    Vital Signs Last 24 Hrs  T(C): 36.8 (11 Dec 2018 20:08), Max: 36.9 (11 Dec 2018 06:17)  T(F): 98.2 (11 Dec 2018 20:08), Max: 98.4 (11 Dec 2018 06:17)  HR: 89 (11 Dec 2018 20:08) (82 - 95)  BP: 127/80 (11 Dec 2018 20:08) (113/65 - 148/74)  BP(mean): --  RR: 18 (11 Dec 2018 16:39) (16 - 18)  SpO2: 99% (11 Dec 2018 20:08) (99% - 99%)    CAPILLARY BLOOD GLUCOSE        I&O's Summary      Constitutional: No fever, fatigue  Skin: No rash.  Eyes: No recent vision problems or eye pain.  ENT: Nasal congestion+  Cardiovascular: No chest pain or palpation.  Respiratory: No cough, shortness of breath, congestion, or wheezing.  Gastrointestinal: No abdominal pain, nausea, vomiting, or diarrhea.  Genitourinary: No dysuria.  Musculoskeletal: No joint swelling.  Neurologic: No headache.    PHYSICAL EXAM:  GENERAL: NAD  EYES: EOMI, PERRLA  NECK: Supple, No JVD  CHEST/LUNG: dec breath sounds at bases   HEART:  S1 , S2 +  ABDOMEN: soft , bs+  EXTREMITIES:  no edema  NEUROLOGY:alert awake      LABS:                        13.0   8.43  )-----------( 238      ( 11 Dec 2018 06:11 )             38.7     12-11    138  |  103  |  23  ----------------------------<  103<H>  4.1   |  23  |  1.14    Ca    9.0      11 Dec 2018 06:11  Mg     1.9     12-11    TPro  7.6  /  Alb  4.0  /  TBili  0.3  /  DBili  x   /  AST  17  /  ALT  20  /  AlkPhos  75  12-10    PT/INR - ( 10 Dec 2018 02:10 )   PT: 10.7 SEC;   INR: 0.97          PTT - ( 10 Dec 2018 02:10 )  PTT:29.1 SEC  CARDIAC MARKERS ( 10 Dec 2018 02:10 )  x     / x     / 117 u/L / 4.08 ng/mL / x              RADIOLOGY & ADDITIONAL TESTS:    Imaging Personally Reviewed:    Consultant(s) Notes Reviewed:      Care Discussed with Consultants/Other Providers:
SUBJECTIVE / OVERNIGHT EVENTS: pt denies chest pain,,concerned about his nasal congestin / difficulty breathing , no stridor , pt decided to see ENT as out pt , want to go home    MEDICATIONS  (STANDING):  aspirin enteric coated 81 milliGRAM(s) Oral daily  atorvastatin 40 milliGRAM(s) Oral at bedtime  clopidogrel Tablet 75 milliGRAM(s) Oral daily  enoxaparin Injectable 40 milliGRAM(s) SubCutaneous every 24 hours  fluticasone propionate 50 MICROgram(s)/spray Nasal Spray 2 Spray(s) Both Nostrils daily  loratadine 10 milliGRAM(s) Oral daily  metoprolol succinate ER 50 milliGRAM(s) Oral two times a day  sodium chloride 0.65% Nasal 1 Spray(s) Both Nostrils two times a day    MEDICATIONS  (PRN):    Vital Signs Last 24 Hrs  T(C): 37 (12 Dec 2018 17:31), Max: 37.1 (12 Dec 2018 13:15)  T(F): 98.6 (12 Dec 2018 17:31), Max: 98.8 (12 Dec 2018 13:15)  HR: 88 (12 Dec 2018 17:31) (82 - 104)  BP: 156/95 (12 Dec 2018 17:31) (113/69 - 156/95)  BP(mean): --  RR: 18 (12 Dec 2018 17:31) (18 - 18)  SpO2: 99% (12 Dec 2018 17:31) (98% - 100%)    Constitutional: No fever, fatigue  Skin: No rash.  Eyes: No recent vision problems or eye pain.  ENT: Nasal congestion+  Cardiovascular: No chest pain or palpation.  Respiratory: No cough, shortness of breath, congestion, or wheezing.  Gastrointestinal: No abdominal pain, nausea, vomiting, or diarrhea.  Genitourinary: No dysuria.  Musculoskeletal: No joint swelling.  Neurologic: No headache.    PHYSICAL EXAM:  GENERAL: NAD  EYES: EOMI, PERRLA  NECK: Supple, No JVD  CHEST/LUNG: dec breath sounds at bases   HEART:  S1 , S2 +  ABDOMEN: soft , bs+  EXTREMITIES:  no edema  NEUROLOGY:alert awake      LABS:  12-12    138  |  104  |  22  ----------------------------<  100<H>  4.3   |  22  |  1.03    Ca    9.0      12 Dec 2018 06:50  Mg     2.0     12-12      Creatinine Trend: 1.03 <--, 1.14 <--, 1.05 <--, 1.06 <--, 1.25 <--, 1.55 <--                        12.8   7.71  )-----------( 240      ( 12 Dec 2018 06:50 )             37.5     Urine Studies:                      RADIOLOGY & ADDITIONAL TESTS:    Imaging Personally Reviewed:    Consultant(s) Notes Reviewed:      Care Discussed with Consultants/Other Providers:

## 2018-12-12 NOTE — PROGRESS NOTE ADULT - PROBLEM SELECTOR PLAN 1
likely upper airway   mucosal thickening + on ct   ent eval
likely upper airway   mucosal thickening + on ct   ent eval as out pt

## 2018-12-24 ENCOUNTER — EMERGENCY (EMERGENCY)
Facility: HOSPITAL | Age: 58
LOS: 1 days | Discharge: HOME | End: 2018-12-24
Attending: EMERGENCY MEDICINE | Admitting: INTERNAL MEDICINE
Payer: COMMERCIAL

## 2018-12-24 VITALS
TEMPERATURE: 98 F | OXYGEN SATURATION: 100 % | HEART RATE: 63 BPM | RESPIRATION RATE: 16 BRPM | DIASTOLIC BLOOD PRESSURE: 89 MMHG | SYSTOLIC BLOOD PRESSURE: 150 MMHG

## 2018-12-24 LAB
ALBUMIN SERPL ELPH-MCNC: 3.9 G/DL — SIGNIFICANT CHANGE UP (ref 3.3–5)
ALP SERPL-CCNC: 77 U/L — SIGNIFICANT CHANGE UP (ref 40–120)
ALT FLD-CCNC: 22 U/L — SIGNIFICANT CHANGE UP (ref 4–41)
APTT BLD: 32.1 SEC — SIGNIFICANT CHANGE UP (ref 27.5–36.3)
AST SERPL-CCNC: 11 U/L — SIGNIFICANT CHANGE UP (ref 4–40)
BASOPHILS # BLD AUTO: 0.11 K/UL — SIGNIFICANT CHANGE UP (ref 0–0.2)
BASOPHILS NFR BLD AUTO: 1.5 % — SIGNIFICANT CHANGE UP (ref 0–2)
BILIRUB SERPL-MCNC: 0.3 MG/DL — SIGNIFICANT CHANGE UP (ref 0.2–1.2)
BUN SERPL-MCNC: 17 MG/DL — SIGNIFICANT CHANGE UP (ref 7–23)
CALCIUM SERPL-MCNC: 9.8 MG/DL — SIGNIFICANT CHANGE UP (ref 8.4–10.5)
CHLORIDE SERPL-SCNC: 103 MMOL/L — SIGNIFICANT CHANGE UP (ref 98–107)
CO2 SERPL-SCNC: 26 MMOL/L — SIGNIFICANT CHANGE UP (ref 22–31)
CREAT SERPL-MCNC: 1.07 MG/DL — SIGNIFICANT CHANGE UP (ref 0.5–1.3)
EOSINOPHIL # BLD AUTO: 0.28 K/UL — SIGNIFICANT CHANGE UP (ref 0–0.5)
EOSINOPHIL NFR BLD AUTO: 3.8 % — SIGNIFICANT CHANGE UP (ref 0–6)
GLUCOSE SERPL-MCNC: 100 MG/DL — HIGH (ref 70–99)
HCT VFR BLD CALC: 42.7 % — SIGNIFICANT CHANGE UP (ref 39–50)
HGB BLD-MCNC: 14.3 G/DL — SIGNIFICANT CHANGE UP (ref 13–17)
IMM GRANULOCYTES # BLD AUTO: 0.01 # — SIGNIFICANT CHANGE UP
IMM GRANULOCYTES NFR BLD AUTO: 0.1 % — SIGNIFICANT CHANGE UP (ref 0–1.5)
INR BLD: 1.01 — SIGNIFICANT CHANGE UP (ref 0.88–1.17)
LYMPHOCYTES # BLD AUTO: 2.53 K/UL — SIGNIFICANT CHANGE UP (ref 1–3.3)
LYMPHOCYTES # BLD AUTO: 34.1 % — SIGNIFICANT CHANGE UP (ref 13–44)
MCHC RBC-ENTMCNC: 29.7 PG — SIGNIFICANT CHANGE UP (ref 27–34)
MCHC RBC-ENTMCNC: 33.5 % — SIGNIFICANT CHANGE UP (ref 32–36)
MCV RBC AUTO: 88.6 FL — SIGNIFICANT CHANGE UP (ref 80–100)
MONOCYTES # BLD AUTO: 0.62 K/UL — SIGNIFICANT CHANGE UP (ref 0–0.9)
MONOCYTES NFR BLD AUTO: 8.4 % — SIGNIFICANT CHANGE UP (ref 2–14)
NEUTROPHILS # BLD AUTO: 3.87 K/UL — SIGNIFICANT CHANGE UP (ref 1.8–7.4)
NEUTROPHILS NFR BLD AUTO: 52.1 % — SIGNIFICANT CHANGE UP (ref 43–77)
NRBC # FLD: 0 — SIGNIFICANT CHANGE UP
PLATELET # BLD AUTO: 265 K/UL — SIGNIFICANT CHANGE UP (ref 150–400)
PMV BLD: 9.2 FL — SIGNIFICANT CHANGE UP (ref 7–13)
POTASSIUM SERPL-MCNC: 4.2 MMOL/L — SIGNIFICANT CHANGE UP (ref 3.5–5.3)
POTASSIUM SERPL-SCNC: 4.2 MMOL/L — SIGNIFICANT CHANGE UP (ref 3.5–5.3)
PROT SERPL-MCNC: 7.6 G/DL — SIGNIFICANT CHANGE UP (ref 6–8.3)
PROTHROM AB SERPL-ACNC: 11.2 SEC — SIGNIFICANT CHANGE UP (ref 9.8–13.1)
RBC # BLD: 4.82 M/UL — SIGNIFICANT CHANGE UP (ref 4.2–5.8)
RBC # FLD: 11.5 % — SIGNIFICANT CHANGE UP (ref 10.3–14.5)
SODIUM SERPL-SCNC: 140 MMOL/L — SIGNIFICANT CHANGE UP (ref 135–145)
TROPONIN T, HIGH SENSITIVITY: 8 NG/L — SIGNIFICANT CHANGE UP (ref ?–14)
WBC # BLD: 7.42 K/UL — SIGNIFICANT CHANGE UP (ref 3.8–10.5)
WBC # FLD AUTO: 7.42 K/UL — SIGNIFICANT CHANGE UP (ref 3.8–10.5)

## 2018-12-24 PROCEDURE — 93010 ELECTROCARDIOGRAM REPORT: CPT

## 2018-12-24 PROCEDURE — 99285 EMERGENCY DEPT VISIT HI MDM: CPT | Mod: 25

## 2018-12-24 RX ORDER — ASPIRIN/CALCIUM CARB/MAGNESIUM 324 MG
324 TABLET ORAL ONCE
Qty: 0 | Refills: 0 | Status: DISCONTINUED | OUTPATIENT
Start: 2018-12-24 | End: 2018-12-24

## 2018-12-24 RX ORDER — MORPHINE SULFATE 50 MG/1
4 CAPSULE, EXTENDED RELEASE ORAL ONCE
Qty: 0 | Refills: 0 | Status: DISCONTINUED | OUTPATIENT
Start: 2018-12-24 | End: 2018-12-24

## 2018-12-24 NOTE — ED PROVIDER NOTE - MEDICAL DECISION MAKING DETAILS
pt is a 59 y/o M w/ a PMHx of CAD s/p AARON on 12/7/18 to mid LAD and HTN who p/w left sided CP concerning for thrombosed stent vs ACS low suspicion for PE given low wells scorel; given history and exam low suspicion for dissection. Will get BC, CMP, PT/INR, aPTT, trop, CXR, EKG, and patient already took aspirin, will give morphine for pain.

## 2018-12-24 NOTE — ED PROVIDER NOTE - PROGRESS NOTE DETAILS
Patient with no chest pain. trops negative. Given high risk will admit to Tele for monitoring.   -Crescencio Nails PGY4 [facility]#[id number] Trops negative. Spoke to Dr. Garcia and will admit to Tele. Text paged Tele PA.  -Crescencio Nails PGY4 [facility]#[id number]

## 2018-12-24 NOTE — ED PROVIDER NOTE - OBJECTIVE STATEMENT
pt is a 57 y/o M w/ a PMHx of CAD s/p AARON on 12/7/18 to mid LAD and HTN who p/w left sided CP that started at about 5pm that he described as pressure, no radiation, no diaphoresis, no shortness of breath, pt also felt a little weak today while walking. having pain right now and 3-4/10. No trauma to chest.   patient took aspirin 162 rior to coming in  No tobacco    ROS: Denies fevers, chills, CP, Abdominal pain, rhinorrhea, new rashes, new LE edema, new visual changes, confusion, headaches, blood in stools, N/V, dysuria, and hematuria.

## 2018-12-24 NOTE — ED PROVIDER NOTE - ATTENDING CONTRIBUTION TO CARE
Dr. Collier: I have personally performed a face to face bedside history and physical examination of this patient. I have discussed the history, examination, review of systems, assessment and plan of management with the resident. I have reviewed the electronic medical record and amended it to reflect my history, review of systems, physical exam, assessment and plan.      Pt with pmh of CAD, s/p coronary stent 3 weeks ago here with chest pressure and mild dizziness since 5pm. no sob, leg swelling, f/c. s/p ASA x 2 this evening.    on exam  cTA bl  RRR s1 s2  no LE edema    r/o ACS, stent thrombosis, PTX, PNA, CHF. do not think PE or AD given this presentation.    plan for EKg, labs, CXR, admit

## 2018-12-24 NOTE — ED ADULT TRIAGE NOTE - CHIEF COMPLAINT QUOTE
alert c/o non radiating left chest pain and high blood pressure was 177/100 at home now 150/89   had stent placed 12/7

## 2018-12-25 VITALS
HEART RATE: 65 BPM | DIASTOLIC BLOOD PRESSURE: 70 MMHG | RESPIRATION RATE: 17 BRPM | TEMPERATURE: 98 F | OXYGEN SATURATION: 99 % | SYSTOLIC BLOOD PRESSURE: 117 MMHG

## 2018-12-25 DIAGNOSIS — R07.9 CHEST PAIN, UNSPECIFIED: ICD-10-CM

## 2018-12-25 DIAGNOSIS — E78.5 HYPERLIPIDEMIA, UNSPECIFIED: ICD-10-CM

## 2018-12-25 DIAGNOSIS — I10 ESSENTIAL (PRIMARY) HYPERTENSION: ICD-10-CM

## 2018-12-25 DIAGNOSIS — Z95.5 PRESENCE OF CORONARY ANGIOPLASTY IMPLANT AND GRAFT: Chronic | ICD-10-CM

## 2018-12-25 PROBLEM — I25.10 ATHEROSCLEROTIC HEART DISEASE OF NATIVE CORONARY ARTERY WITHOUT ANGINA PECTORIS: Chronic | Status: ACTIVE | Noted: 2018-12-10

## 2018-12-25 LAB
BUN SERPL-MCNC: 15 MG/DL — SIGNIFICANT CHANGE UP (ref 7–23)
CALCIUM SERPL-MCNC: 9.5 MG/DL — SIGNIFICANT CHANGE UP (ref 8.4–10.5)
CHLORIDE SERPL-SCNC: 106 MMOL/L — SIGNIFICANT CHANGE UP (ref 98–107)
CO2 SERPL-SCNC: 22 MMOL/L — SIGNIFICANT CHANGE UP (ref 22–31)
CREAT SERPL-MCNC: 1.02 MG/DL — SIGNIFICANT CHANGE UP (ref 0.5–1.3)
GLUCOSE SERPL-MCNC: 97 MG/DL — SIGNIFICANT CHANGE UP (ref 70–99)
HCT VFR BLD CALC: 41.5 % — SIGNIFICANT CHANGE UP (ref 39–50)
HGB BLD-MCNC: 13.5 G/DL — SIGNIFICANT CHANGE UP (ref 13–17)
MAGNESIUM SERPL-MCNC: 1.9 MG/DL — SIGNIFICANT CHANGE UP (ref 1.6–2.6)
MCHC RBC-ENTMCNC: 29.2 PG — SIGNIFICANT CHANGE UP (ref 27–34)
MCHC RBC-ENTMCNC: 32.5 % — SIGNIFICANT CHANGE UP (ref 32–36)
MCV RBC AUTO: 89.8 FL — SIGNIFICANT CHANGE UP (ref 80–100)
NRBC # FLD: 0 — SIGNIFICANT CHANGE UP
PLATELET # BLD AUTO: 254 K/UL — SIGNIFICANT CHANGE UP (ref 150–400)
PMV BLD: 10.4 FL — SIGNIFICANT CHANGE UP (ref 7–13)
POTASSIUM SERPL-MCNC: 4 MMOL/L — SIGNIFICANT CHANGE UP (ref 3.5–5.3)
POTASSIUM SERPL-SCNC: 4 MMOL/L — SIGNIFICANT CHANGE UP (ref 3.5–5.3)
RBC # BLD: 4.62 M/UL — SIGNIFICANT CHANGE UP (ref 4.2–5.8)
RBC # FLD: 11.7 % — SIGNIFICANT CHANGE UP (ref 10.3–14.5)
SODIUM SERPL-SCNC: 142 MMOL/L — SIGNIFICANT CHANGE UP (ref 135–145)
TROPONIN T, HIGH SENSITIVITY: 8 NG/L — SIGNIFICANT CHANGE UP (ref ?–14)
WBC # BLD: 6.62 K/UL — SIGNIFICANT CHANGE UP (ref 3.8–10.5)
WBC # FLD AUTO: 6.62 K/UL — SIGNIFICANT CHANGE UP (ref 3.8–10.5)

## 2018-12-25 RX ORDER — HEPARIN SODIUM 5000 [USP'U]/ML
5000 INJECTION INTRAVENOUS; SUBCUTANEOUS EVERY 12 HOURS
Qty: 0 | Refills: 0 | Status: DISCONTINUED | OUTPATIENT
Start: 2018-12-25 | End: 2018-12-25

## 2018-12-25 RX ORDER — PANTOPRAZOLE SODIUM 20 MG/1
40 TABLET, DELAYED RELEASE ORAL
Qty: 0 | Refills: 0 | Status: DISCONTINUED | OUTPATIENT
Start: 2018-12-25 | End: 2018-12-25

## 2018-12-25 RX ORDER — PANTOPRAZOLE SODIUM 20 MG/1
1 TABLET, DELAYED RELEASE ORAL
Qty: 30 | Refills: 0 | OUTPATIENT
Start: 2018-12-25 | End: 2019-01-23

## 2018-12-25 RX ORDER — ASPIRIN/CALCIUM CARB/MAGNESIUM 324 MG
81 TABLET ORAL DAILY
Qty: 0 | Refills: 0 | Status: DISCONTINUED | OUTPATIENT
Start: 2018-12-25 | End: 2018-12-25

## 2018-12-25 RX ORDER — LOSARTAN POTASSIUM 100 MG/1
50 TABLET, FILM COATED ORAL DAILY
Qty: 0 | Refills: 0 | Status: DISCONTINUED | OUTPATIENT
Start: 2018-12-25 | End: 2018-12-25

## 2018-12-25 RX ORDER — CLOPIDOGREL BISULFATE 75 MG/1
75 TABLET, FILM COATED ORAL DAILY
Qty: 0 | Refills: 0 | Status: DISCONTINUED | OUTPATIENT
Start: 2018-12-25 | End: 2018-12-25

## 2018-12-25 RX ORDER — LOSARTAN POTASSIUM 100 MG/1
1 TABLET, FILM COATED ORAL
Qty: 30 | Refills: 0 | OUTPATIENT
Start: 2018-12-25 | End: 2019-01-23

## 2018-12-25 RX ORDER — FLUTICASONE PROPIONATE 50 MCG
1 SPRAY, SUSPENSION NASAL
Qty: 0 | Refills: 0 | Status: DISCONTINUED | OUTPATIENT
Start: 2018-12-25 | End: 2018-12-25

## 2018-12-25 RX ORDER — LORATADINE 10 MG/1
10 TABLET ORAL DAILY
Qty: 0 | Refills: 0 | Status: DISCONTINUED | OUTPATIENT
Start: 2018-12-25 | End: 2018-12-25

## 2018-12-25 RX ORDER — ATORVASTATIN CALCIUM 80 MG/1
40 TABLET, FILM COATED ORAL AT BEDTIME
Qty: 0 | Refills: 0 | Status: DISCONTINUED | OUTPATIENT
Start: 2018-12-25 | End: 2018-12-25

## 2018-12-25 RX ORDER — METOPROLOL TARTRATE 50 MG
50 TABLET ORAL
Qty: 0 | Refills: 0 | Status: DISCONTINUED | OUTPATIENT
Start: 2018-12-25 | End: 2018-12-25

## 2018-12-25 RX ADMIN — Medication 50 MILLIGRAM(S): at 05:59

## 2018-12-25 RX ADMIN — Medication 81 MILLIGRAM(S): at 12:10

## 2018-12-25 RX ADMIN — PANTOPRAZOLE SODIUM 40 MILLIGRAM(S): 20 TABLET, DELAYED RELEASE ORAL at 05:59

## 2018-12-25 RX ADMIN — Medication 1 SPRAY(S): at 12:10

## 2018-12-25 RX ADMIN — CLOPIDOGREL BISULFATE 75 MILLIGRAM(S): 75 TABLET, FILM COATED ORAL at 12:10

## 2018-12-25 RX ADMIN — HEPARIN SODIUM 5000 UNIT(S): 5000 INJECTION INTRAVENOUS; SUBCUTANEOUS at 05:59

## 2018-12-25 NOTE — DISCHARGE NOTE ADULT - CARE PLAN
Principal Discharge DX:	Coronary artery disease  Goal:	To be asymptomatic, to reduce risks factors such as hypertension, diabetes and hyperlipidemia to lower the risk of blood clots formation; and to prevent complications of coronary artery disease such as worsening chest pain, heart attack and death.  Assessment and plan of treatment:	continue aspirin and plavix, your chest pain is musculoskeletal in nature, take tylenol prn as needed for pain. Follow up with Dr Garcia in 2-3 weeks.  Secondary Diagnosis:	HTN (hypertension)  Goal:	To maintain a normal blood pressure to prevent heart attack, stroke and renal failure.  Assessment and plan of treatment:	Low sodium and fat diet, continue anti-hypertensive medications, and follow up with primary care physician.  Secondary Diagnosis:	HLD (hyperlipidemia)  Goal:	To maintain normal cholesterol levels to prevent stroke, coronary artery disease, peripheral vascular disease and heart attacks.  Assessment and plan of treatment:	Low fat diet, exercise daily and continue current medications. Follow up with primary care physician and cardiologist for management.

## 2018-12-25 NOTE — DISCHARGE NOTE ADULT - HOSPITAL COURSE
59 y/o M w/ a PMHx of CAD s/p AARON on 12/7/18 to mid LAD and HTN, HLD presented with  left sided CP that started at about 4pm. Patient describes is as pressure and sharp like pain, about  3-4/10. no radiation, no diaphoresis, no shortness of breath, pt also complains of dizziness and weakness while walking this morning, states felt more tired. Pt came to ER for further evaluation. Patient took aspirin 162mg prior to coming in ER. Currently appears comfortable NAD, CP free    Hospital course:  EKG; NSR,  CXR: clear lung  trop 8 -> 8    Pt presented with CP, CE negative. EKG no evidence of ischemia. Pt seen by cardiology. CP atypical/MSK. Per Dr Garcia pt cleared for discharge on 12/25

## 2018-12-25 NOTE — H&P ADULT - HISTORY OF PRESENT ILLNESS
This is a 57 y/o M w/ a PMHx of CAD s/p AARON on 12/7/18 to mid LAD and HTN, HLD presented with  left sided CP that started at about 4pm. Patient describes is as pressure and sharp like pain, about  3-4/10. no radiation, no diaphoresis, no shortness of breath, pt also complains of dizziness and weakness while walking this morning, states felt more tired. Pt came to ER for further evaluation. Patient took aspirin 162mg prior to coming in ER. Currently appears comfortable NAD. This is a 57 y/o M w/ a PMHx of CAD s/p AARON on 12/7/18 to mid LAD and HTN, HLD presented with  left sided CP that started at about 4pm. Patient describes is as pressure and sharp like pain, about  3-4/10. no radiation, no diaphoresis, no shortness of breath, pt also complains of dizziness and weakness while walking this morning, states felt more tired. Pt came to ER for further evaluation. Patient took aspirin 162mg prior to coming in ER. Currently appears comfortable NAD, CP free

## 2018-12-25 NOTE — H&P ADULT - PROBLEM SELECTOR PLAN 1
- r/o ACS, r/o in-stent restenosis  - consider cardiac eval  - c/w asa/plavix/ BB  - serial EKG  - tele  - consider echo  - trop /CPK

## 2018-12-25 NOTE — DISCHARGE NOTE ADULT - PATIENT PORTAL LINK FT
You can access the [facility] Patient Portal, offered by [facility], by registering with the following website: [email or web address]

## 2018-12-25 NOTE — H&P ADULT - ATTENDING COMMENTS
Patient was seen and examined,interim events noted,labs and radiology studies reviewed.  Rachid Garcia MD,[facility]  [address] [place] [address].  [id number].  [phone]     Patient seen pain appears musculoskeletal-no indication for cardiac injury,can discharge to follow up in office.

## 2018-12-25 NOTE — H&P ADULT - RS GEN PE MLT RESP DETAILS PC
airway patent/clear to auscultation bilaterally/no chest wall tenderness/good air movement/normal/breath sounds equal/respirations non-labored

## 2018-12-25 NOTE — DISCHARGE NOTE ADULT - MEDICATION SUMMARY - MEDICATIONS TO TAKE
I will START or STAY ON the medications listed below when I get home from the hospital:    Aspir 81 oral delayed release tablet  -- 1 tab(s) by mouth once a day  -- Indication: For Cad    losartan 50 mg oral tablet  -- 1 tab(s) by mouth once a day  -- Indication: For HTN (hypertension)    loratadine 10 mg oral tablet  -- 1 tab(s) by mouth once a day  -- Indication: For allergy    atorvastatin 40 mg oral tablet  -- 1 tab(s) by mouth once a day (at bedtime)  -- Indication: For HLD (hyperlipidemia)    clopidogrel 75 mg oral tablet  -- 1 tab(s) by mouth once a day  -- Indication: For Cad    metoprolol succinate 50 mg oral tablet, extended release  -- 1 tab(s) by mouth 2 times a day   -- Indication: For HTN (hypertension)    fluticasone 50 mcg/inh nasal spray  -- 50 microgram(s) intranasally once a day   -- For the nose.  It is very important that you take or use this exactly as directed.  Do not skip doses or discontinue unless directed by your doctor.    -- Indication: For allergy    pantoprazole 40 mg oral delayed release tablet  -- 1 tab(s) by mouth once a day (before a meal)  -- Indication: For gerd

## 2018-12-25 NOTE — H&P ADULT - FAMILY HISTORY
Sibling  Still living? Yes, Estimated age: Age Unknown  Family history of early CAD, Age at diagnosis: Age Unknown

## 2018-12-25 NOTE — H&P ADULT - NSHPLABSRESULTS_GEN_ALL_CORE
14.3   7.42  )-----------( 265      ( 24 Dec 2018 23:05 )             42.7   12-24    140  |  103  |  17  ----------------------------<  100<H>  4.2   |  26  |  1.07    Ca    9.8      24 Dec 2018 23:05    TPro  7.6  /  Alb  3.9  /  TBili  0.3  /  DBili  x   /  AST  11  /  ALT  22  /  AlkPhos  77  12-24

## 2018-12-25 NOTE — DISCHARGE NOTE ADULT - PLAN OF CARE
To maintain normal cholesterol levels to prevent stroke, coronary artery disease, peripheral vascular disease and heart attacks. Low fat diet, exercise daily and continue current medications. Follow up with primary care physician and cardiologist for management. To be asymptomatic, to reduce risks factors such as hypertension, diabetes and hyperlipidemia to lower the risk of blood clots formation; and to prevent complications of coronary artery disease such as worsening chest pain, heart attack and death. continue aspirin and plavix, your chest pain is musculoskeletal in nature, take tylenol prn as needed for pain. Follow up with Dr Garcia in 2-3 weeks. To maintain a normal blood pressure to prevent heart attack, stroke and renal failure. Low sodium and fat diet, continue anti-hypertensive medications, and follow up with primary care physician.

## 2019-03-22 NOTE — ED PROVIDER NOTE - CHIEF COMPLAINT
The patient is a 58y Male complaining of chest pressure. The patient is a 3y10m Male complaining of medical evaluation.

## 2019-04-02 NOTE — PATIENT PROFILE ADULT - DOES PATIENT HAVE ADVANCE DIRECTIVE
Pt states was called by OBGYN to come to ED for confirmed ectopic pregnancy. Pt states US in office did not show pregnancy, HCG 8885. LMP: 02/14. Pt reports experiencing R pelvic pain, no pain at this time. VSS> Pt appears comfortable.
no

## 2019-04-02 NOTE — PATIENT PROFILE ADULT - LAST BOWEL MOVEMENT DATE
,DirectAddress_Unknown,[email or web address] ,DirectAddress_Unknown,[email or web address],DirectAddress_Unknown 29-Nov-2018

## 2019-04-24 PROCEDURE — 78452 HT MUSCLE IMAGE SPECT MULT: CPT

## 2019-04-24 PROCEDURE — 85027 COMPLETE CBC AUTOMATED: CPT

## 2019-04-24 PROCEDURE — 85610 PROTHROMBIN TIME: CPT

## 2019-04-24 PROCEDURE — 83735 ASSAY OF MAGNESIUM: CPT

## 2019-04-24 PROCEDURE — 93005 ELECTROCARDIOGRAM TRACING: CPT

## 2019-04-24 PROCEDURE — 82553 CREATINE MB FRACTION: CPT

## 2019-04-24 PROCEDURE — 70498 CT ANGIOGRAPHY NECK: CPT

## 2019-04-24 PROCEDURE — 93306 TTE W/DOPPLER COMPLETE: CPT

## 2019-04-24 PROCEDURE — 82607 VITAMIN B-12: CPT

## 2019-04-24 PROCEDURE — G0378: CPT

## 2019-04-24 PROCEDURE — 93017 CV STRESS TEST TRACING ONLY: CPT

## 2019-04-24 PROCEDURE — 99285 EMERGENCY DEPT VISIT HI MDM: CPT | Mod: 25

## 2019-04-24 PROCEDURE — 85730 THROMBOPLASTIN TIME PARTIAL: CPT

## 2019-04-24 PROCEDURE — 84100 ASSAY OF PHOSPHORUS: CPT

## 2019-04-24 PROCEDURE — 71046 X-RAY EXAM CHEST 2 VIEWS: CPT

## 2019-04-24 PROCEDURE — 80048 BASIC METABOLIC PNL TOTAL CA: CPT

## 2019-04-24 PROCEDURE — 70496 CT ANGIOGRAPHY HEAD: CPT

## 2019-04-24 PROCEDURE — A9502: CPT

## 2019-04-24 PROCEDURE — 83880 ASSAY OF NATRIURETIC PEPTIDE: CPT

## 2019-04-24 PROCEDURE — 80053 COMPREHEN METABOLIC PANEL: CPT

## 2019-04-24 PROCEDURE — 71275 CT ANGIOGRAPHY CHEST: CPT

## 2019-04-24 PROCEDURE — 80061 LIPID PANEL: CPT

## 2019-04-24 PROCEDURE — 84484 ASSAY OF TROPONIN QUANT: CPT

## 2019-04-24 PROCEDURE — 83036 HEMOGLOBIN GLYCOSYLATED A1C: CPT

## 2019-04-24 PROCEDURE — 82550 ASSAY OF CK (CPK): CPT

## 2019-04-24 PROCEDURE — 84443 ASSAY THYROID STIM HORMONE: CPT

## 2019-06-03 ENCOUNTER — EMERGENCY (EMERGENCY)
Facility: HOSPITAL | Age: 59
LOS: 1 days | Discharge: HOME | End: 2019-06-03
Attending: EMERGENCY MEDICINE | Admitting: EMERGENCY MEDICINE
Payer: COMMERCIAL

## 2019-06-03 VITALS
OXYGEN SATURATION: 99 % | TEMPERATURE: 98 F | HEART RATE: 106 BPM | DIASTOLIC BLOOD PRESSURE: 102 MMHG | SYSTOLIC BLOOD PRESSURE: 178 MMHG | RESPIRATION RATE: 16 BRPM

## 2019-06-03 DIAGNOSIS — Z95.5 PRESENCE OF CORONARY ANGIOPLASTY IMPLANT AND GRAFT: Chronic | ICD-10-CM

## 2019-06-03 PROCEDURE — 99284 EMERGENCY DEPT VISIT MOD MDM: CPT | Mod: 25

## 2019-06-03 PROCEDURE — 93010 ELECTROCARDIOGRAM REPORT: CPT

## 2019-06-03 NOTE — ED ADULT TRIAGE NOTE - CHIEF COMPLAINT QUOTE
Pt reports elevated BP and palpitations today since ~2pm.  Pt also reports gout flare up in right foot.  Pt had cardiac stent placed here on 12/7/18.

## 2019-06-04 VITALS
RESPIRATION RATE: 18 BRPM | DIASTOLIC BLOOD PRESSURE: 82 MMHG | OXYGEN SATURATION: 100 % | HEART RATE: 79 BPM | TEMPERATURE: 98 F | SYSTOLIC BLOOD PRESSURE: 151 MMHG

## 2019-06-04 LAB
ALBUMIN SERPL ELPH-MCNC: 4.2 G/DL — SIGNIFICANT CHANGE UP (ref 3.3–5)
ALP SERPL-CCNC: 92 U/L — SIGNIFICANT CHANGE UP (ref 40–120)
ALT FLD-CCNC: 21 U/L — SIGNIFICANT CHANGE UP (ref 4–41)
ANION GAP SERPL CALC-SCNC: 16 MMO/L — HIGH (ref 7–14)
AST SERPL-CCNC: 14 U/L — SIGNIFICANT CHANGE UP (ref 4–40)
BASOPHILS # BLD AUTO: 0.02 K/UL — SIGNIFICANT CHANGE UP (ref 0–0.2)
BASOPHILS NFR BLD AUTO: 0.2 % — SIGNIFICANT CHANGE UP (ref 0–2)
BILIRUB SERPL-MCNC: 0.3 MG/DL — SIGNIFICANT CHANGE UP (ref 0.2–1.2)
BUN SERPL-MCNC: 16 MG/DL — SIGNIFICANT CHANGE UP (ref 7–23)
CALCIUM SERPL-MCNC: 9.7 MG/DL — SIGNIFICANT CHANGE UP (ref 8.4–10.5)
CHLORIDE SERPL-SCNC: 101 MMOL/L — SIGNIFICANT CHANGE UP (ref 98–107)
CO2 SERPL-SCNC: 23 MMOL/L — SIGNIFICANT CHANGE UP (ref 22–31)
CREAT SERPL-MCNC: 0.9 MG/DL — SIGNIFICANT CHANGE UP (ref 0.5–1.3)
EOSINOPHIL # BLD AUTO: 0 K/UL — SIGNIFICANT CHANGE UP (ref 0–0.5)
EOSINOPHIL NFR BLD AUTO: 0 % — SIGNIFICANT CHANGE UP (ref 0–6)
GLUCOSE SERPL-MCNC: 163 MG/DL — HIGH (ref 70–99)
HCT VFR BLD CALC: 47.8 % — SIGNIFICANT CHANGE UP (ref 39–50)
HGB BLD-MCNC: 15.7 G/DL — SIGNIFICANT CHANGE UP (ref 13–17)
IMM GRANULOCYTES NFR BLD AUTO: 0.2 % — SIGNIFICANT CHANGE UP (ref 0–1.5)
LYMPHOCYTES # BLD AUTO: 1.78 K/UL — SIGNIFICANT CHANGE UP (ref 1–3.3)
LYMPHOCYTES # BLD AUTO: 16 % — SIGNIFICANT CHANGE UP (ref 13–44)
MCHC RBC-ENTMCNC: 28.5 PG — SIGNIFICANT CHANGE UP (ref 27–34)
MCHC RBC-ENTMCNC: 32.8 % — SIGNIFICANT CHANGE UP (ref 32–36)
MCV RBC AUTO: 86.8 FL — SIGNIFICANT CHANGE UP (ref 80–100)
MONOCYTES # BLD AUTO: 0.55 K/UL — SIGNIFICANT CHANGE UP (ref 0–0.9)
MONOCYTES NFR BLD AUTO: 4.9 % — SIGNIFICANT CHANGE UP (ref 2–14)
NEUTROPHILS # BLD AUTO: 8.77 K/UL — HIGH (ref 1.8–7.4)
NEUTROPHILS NFR BLD AUTO: 78.7 % — HIGH (ref 43–77)
NRBC # FLD: 0 K/UL — SIGNIFICANT CHANGE UP (ref 0–0)
PLATELET # BLD AUTO: 273 K/UL — SIGNIFICANT CHANGE UP (ref 150–400)
PMV BLD: 9.9 FL — SIGNIFICANT CHANGE UP (ref 7–13)
POTASSIUM SERPL-MCNC: 4.7 MMOL/L — SIGNIFICANT CHANGE UP (ref 3.5–5.3)
POTASSIUM SERPL-SCNC: 4.7 MMOL/L — SIGNIFICANT CHANGE UP (ref 3.5–5.3)
PROT SERPL-MCNC: 7.3 G/DL — SIGNIFICANT CHANGE UP (ref 6–8.3)
RBC # BLD: 5.51 M/UL — SIGNIFICANT CHANGE UP (ref 4.2–5.8)
RBC # FLD: 11.7 % — SIGNIFICANT CHANGE UP (ref 10.3–14.5)
SODIUM SERPL-SCNC: 140 MMOL/L — SIGNIFICANT CHANGE UP (ref 135–145)
TROPONIN T, HIGH SENSITIVITY: < 6 NG/L — SIGNIFICANT CHANGE UP (ref ?–14)
TSH SERPL-MCNC: 0.76 UIU/ML — SIGNIFICANT CHANGE UP (ref 0.27–4.2)
WBC # BLD: 11.14 K/UL — HIGH (ref 3.8–10.5)
WBC # FLD AUTO: 11.14 K/UL — HIGH (ref 3.8–10.5)

## 2019-06-04 PROCEDURE — 71046 X-RAY EXAM CHEST 2 VIEWS: CPT | Mod: 26

## 2019-06-04 RX ORDER — ASPIRIN/CALCIUM CARB/MAGNESIUM 324 MG
162 TABLET ORAL ONCE
Refills: 0 | Status: COMPLETED | OUTPATIENT
Start: 2019-06-04 | End: 2019-06-04

## 2019-06-04 RX ADMIN — Medication 162 MILLIGRAM(S): at 01:41

## 2019-06-04 NOTE — ED PROVIDER NOTE - CPE EDP NEURO NORM
Michelle Colorado is a 32 y.o. female  Chief Complaint   Patient presents with    Thyroid Problem     1. Have you been to the ER, urgent care clinic since your last visit? Hospitalized since your last visit? No    2. Have you seen or consulted any other health care providers outside of the [address] [place] [address] since your last visit? Include any pap smears or colon screening.  No normal...

## 2019-06-04 NOTE — ED PROVIDER NOTE - OBJECTIVE STATEMENT
58M with hx of HTN, CAD s/p stent (dec 2018), gout p/w palpitations and elevated BP this afternoon for a few hours. patient states he went to see his PMD for an acute gout flare of his right foot and was given a medication (he thinks colchicine) and then developed the feeling of his heart racing and elevated BP. Patient called his PMD and reported symptoms, PMD states he didn't think this medication was the cause of sx because he has taken it before. Patient states sx resolved before arrival to ED. Denies any sob, cp, back pain, diaphoresis, fevers, cough.  states he has been complaint with his medications since stent. took ASA today 81mg. Patient states baseline HR 60s-70s, at time of episode, HR in the 90s.     Cardiologist, Dr. Rachid Garcia

## 2019-06-04 NOTE — ED PROVIDER NOTE - ATTENDING CONTRIBUTION TO CARE
HPI: 58M with hx of HTN, CAD s/p stent (dec 2018), gout p/w palpitations and elevated BP this afternoon for a few hours. patient states he went to see his PMD for an acute gout flare of his right foot and was given a medication (he thinks colchicine) and then developed the feeling of his heart racing and elevated BP. Patient called his PMD and reported symptoms, PMD states he didn't think this medication was the cause of sx because he has taken it before. Patient states sx resolved before arrival to ED. Denies any sob, cp, back pain, diaphoresis, fevers, cough.  states he has been complaint with his medications since stent. took ASA today 81mg. Patient states baseline HR 60s-70s, at time of episode, HR in the 90s.   EXAM: NAD, heart RRR, no M/R/G/JVD, lungs ctab, no pitting edema BLE.   MDM: concern for palpitations from Meds reactions but unlikely ACS as pt has had MI in past requiring stent and this does not feel the same. He currently has no symptoms in ED. EKG shows NSR without ischemic changes. Will obtain labs including trop and CXR and reassess. Keep pt on monitor in meantime to look for arrythmias.

## 2019-06-04 NOTE — ED ADULT NURSE NOTE - OBJECTIVE STATEMENT
Pt received in room 4, AA&OX4. Pt has hx of HTN, pt states compliant with BP medications and stent placed in December 2018. Pt states BP at 176/102 at around 2PM and reports taking BP medication today. Pt reports "feels heart rate is high." Pt denies chest pain, abdominal pain, SOB, headache, dizziness, weakness, nausea, vomiting, diarrhea, fever, chills. Pt states he went to PCP for gout and was prescribed medication for gout. Pt placed on cardiac monitor,

## 2019-06-04 NOTE — ED ADULT NURSE REASSESSMENT NOTE - NS ED NURSE REASSESS COMMENT FT1
Pt resting comfortably, pt in no acute distress and denying any pain or discomfort. Will continue to monitor.

## 2019-06-04 NOTE — ED ADULT NURSE NOTE - NSIMPLEMENTINTERV_GEN_ALL_ED
Implemented All Fall with Harm Risk Interventions:  [place] to call system. Call bell, personal items and telephone within reach. Instruct patient to call for assistance. Room bathroom lighting operational. Non-slip footwear when patient is off stretcher. Physically safe environment: no spills, clutter or unnecessary equipment. Stretcher in lowest position, wheels locked, appropriate side rails in place. Provide visual cue, wrist band, yellow gown, etc. Monitor gait and stability. Monitor for mental status changes and reorient to person, place, and time. Review medications for side effects contributing to fall risk. Reinforce activity limits and safety measures with patient and family. Provide visual clues: red socks.

## 2019-10-21 NOTE — ED ADULT NURSE NOTE - NSSISCREENINGQ2_ED_A_ED
No Wound Care: Petrolatum Medical Necessity Information: It is in your best interest to select a reason for this procedure from the list below. All of these items fulfill various CMS LCD requirements except the new and changing color options. X Size Of Lesion In Cm (Optional): 0 Render Path Notes In Note?: No Was A Bandage Applied: Yes Anesthesia Type: 1% lidocaine with epinephrine Post-Care Instructions: I reviewed with the patient in detail post-care instructions. Patient is to keep the biopsy site dry overnight, and then apply bacitracin twice daily until healed. Patient may apply hydrogen peroxide soaks to remove any crusting. Biopsy Method: Dermablade Medical Necessity Clause: This procedure was medically necessary because the lesion that was treated was: Notification Instructions: Patient will be notified of biopsy results. However, patient instructed to call the office if not contacted within 2 weeks. Hemostasis: Drysol Path Notes (To The Dermatopathologist): Please check margins. Detail Level: Detailed Billing Type: Third-Party Bill Consent was obtained from the patient. The risks and benefits to therapy were discussed in detail. Specifically, the risks of infection, scarring, bleeding, prolonged wound healing, incomplete removal, allergy to anesthesia, nerve injury and recurrence were addressed. Prior to the procedure, the treatment site was clearly identified and confirmed by the patient. All components of Universal Protocol/PAUSE Rule completed. Size Of Lesion In Cm (Required): 1.2

## 2020-11-18 ENCOUNTER — EMERGENCY (EMERGENCY)
Facility: HOSPITAL | Age: 60
LOS: 1 days | Discharge: HOME | End: 2020-11-18
Attending: EMERGENCY MEDICINE | Admitting: EMERGENCY MEDICINE
Payer: COMMERCIAL

## 2020-11-18 VITALS
DIASTOLIC BLOOD PRESSURE: 80 MMHG | HEART RATE: 73 BPM | RESPIRATION RATE: 16 BRPM | HEIGHT: 68 IN | OXYGEN SATURATION: 96 % | SYSTOLIC BLOOD PRESSURE: 186 MMHG | TEMPERATURE: 99 F

## 2020-11-18 VITALS
HEART RATE: 61 BPM | TEMPERATURE: 98 F | SYSTOLIC BLOOD PRESSURE: 150 MMHG | DIASTOLIC BLOOD PRESSURE: 79 MMHG | RESPIRATION RATE: 14 BRPM | OXYGEN SATURATION: 100 %

## 2020-11-18 DIAGNOSIS — Z95.5 PRESENCE OF CORONARY ANGIOPLASTY IMPLANT AND GRAFT: Chronic | ICD-10-CM

## 2020-11-18 PROCEDURE — 99284 EMERGENCY DEPT VISIT MOD MDM: CPT | Mod: 25

## 2020-11-18 PROCEDURE — 93010 ELECTROCARDIOGRAM REPORT: CPT

## 2020-11-18 NOTE — ED PROVIDER NOTE - ATTENDING CONTRIBUTION TO CARE
I performed a history and physical exam of the patient and discussed their management with the resident. I reviewed the resident's note and agree with the documented findings and plan of care. I have edited as appropriate. My medical decision making and observations are found above.  Well appearing, symptoms resolving ,no FNDs.

## 2020-11-18 NOTE — ED PROVIDER NOTE - CARE PROVIDER_API CALL
ELI MIKE  [facility]  [address] [address] [place] [address]  Phone: [phone]  Fax: [phone]  Follow Up Time:

## 2020-11-18 NOTE — ED PROVIDER NOTE - OBJECTIVE STATEMENT
59 yo M hx of HTN, CAD w stent, HLD,, presenting due to "my blood pressure is high and I feel headache". Was 174/103 at home. Took his metoprolol and BP was still elevated. Usually 130s/80s. Was taking hi s BP due to his HA and feeling tired. HA started around 6pm,was cooking, intermittent, associated with feeling tired, has had a similar headache a long time ago, usually does not have headaches. Has been taking his BP meds no missed doses recently. DEnies neck pain,vision changes, focal numbness or weakness, n/v, cp/sob. 61 yo M hx of HTN, CAD w stent, HLD,, presenting due to "my blood pressure is high and I feel headache". Was 174/103 at home. Took his metoprolol and BP was still elevated. Usually 130s/80s. Was taking hi s BP due to his HA and feeling tired. HA started around 6pm,was cooking, intermittent, associated with feeling tired, has had a similar headache a long time ago, usually does not have headaches. Has been taking his BP meds no missed doses recently. DEnies neck pain,vision changes, focal numbness or weakness, n/v, cp/sob.  Took an extra dose of losartan tonight.    PMD: Mallory Bradshaw 59 yo M hx of HTN, CAD w stent, HLD,, presenting due to "my blood pressure is high and I feel headache". Was 174/103 at home. Took his metoprolol and BP was still elevated. Usually 130s/80s. Was taking hi s BP due to his HA and feeling tired. HA started around 6pm,was cooking, intermittent, associated with feeling tired, has had a similar headache a long time ago, usually does not have headaches. This HA was not sudden in onset. Has been taking his BP meds no missed doses recently. Denies neck pain, vision changes, focal numbness or weakness, n/v, cp/sob.  Took an extra dose of losartan tonight.    PMD: Mallory Bradshaw 61 yo M hx of HTN, CAD w stent, HLD,, presenting due to "my blood pressure is high and I feel headache". Was 174/103 at home. Took his metoprolol and BP was still elevated. Usually 130s/80s. Was taking his BP due to his HA and feeling tired. HA started around 6pm,was cooking, intermittent and gradual onset, associated with feeling tired, has had a similar headache a long time ago. Has been taking his BP meds no missed doses recently. Denies neck pain, vision changes, focal numbness or weakness, n/v, cp/sob.  Took an extra dose of losartan tonight. Pt endorses increased stress in setting of his son being sick with a "lung infection."     PMD: Mallory Bradshaw

## 2020-11-18 NOTE — ED ADULT NURSE NOTE - OBJECTIVE STATEMENT
Pt received to room 3. Pt A&Ox4, ambulatory. Pt complaining of a worsening headache starting earlier in the day. Pt states he rarely gets headaches but when he does it is usually due to HTN. Pt states his BP was high at home and the headache persisted so he came to the ED. Pt currently without complaints besides a slight headache. Pt states that he takes metoprolol twice a day, and his BP stayed high after taking his night time dose. Respirations equal and unlabored, no acute distress noted. Abdomen soft, round, nondistended, nontender. Skin warm, dry, intact. Denies chest pain, palpitations, SOB, N/V/D, fever, cough, chills, recent sick contacts. PMH HTN, HLD, CAD, coronary stent placement. Cardiac monitor in place, NSR noted. Vital signs as noted, comfort measures provided, call bell within reach. MD at bedside, will continue to monitor.

## 2020-11-18 NOTE — ED ADULT NURSE REASSESSMENT NOTE - NS ED NURSE REASSESS COMMENT FT1
Pt A&Ox4. Pt states that his headache has subsided, currently without complaints at this time. Denies chest pain, palpitations, SOB, headaches, dizziness, vision changes, N/V/D. Cardiac monitor in place, NSR noted. Vital signs as noted, comfort measures provided, call bell within reach. Awaiting MD reevaluation, will continue to monitor.

## 2020-11-18 NOTE — ED PROVIDER NOTE - PROGRESS NOTE DETAILS
Rpt BP is 150/79. Will DC with instructions for close cardiology and PMD f/u. Return precautions discussed at bedside.

## 2020-11-18 NOTE — ED ADULT NURSE NOTE - CHIEF COMPLAINT QUOTE
pt c/o hypertension, states he had a headaches at home which prompted him to take BP. BP at home 197/103. denies dizziness, vision changes. pt compliant with BP meds. PMH HLD, HTN, cardiac stent x1 2018 on plavix.

## 2020-11-18 NOTE — ED PROVIDER NOTE - PATIENT PORTAL LINK FT
You can access the FollowMyHealth Patient Portal offered by [facility] by registering at the following website: [email or web address]. By joining [employer]’s FollowMyHealth portal, you will also be able to view your health information using other applications (apps) compatible with our system.

## 2020-11-18 NOTE — ED PROVIDER NOTE - CLINICAL SUMMARY MEDICAL DECISION MAKING FREE TEXT BOX
Pt p/w HA and high BP at home. On exam in NAD, normal neuro exam, and EKG wnl. Suspect non-specific tension vs migraine HA causing discomfort and increasing pt's BP. No e/o end organ damage: normal neuro exam, no SoB, no CP, other VS wnl. Will given analgesia and reassess BP Pt p/w HA and high BP at home, headache now resolving. On exam in NAD, normal neuro exam, and EKG wnl. Suspect non-specific tension vs migraine HA causing discomfort and increasing pt's BP. No e/o end organ damage: normal neuro exam, no SoB, no CP, other VS wnl. Will give analgesia and reassess BP

## 2020-11-18 NOTE — ED PROVIDER NOTE - NSFOLLOWUPINSTRUCTIONS_ED_ALL_ED_FT
Return to the ER if you have new chest pain, shortness of breath, fevers, inability to eat or drink, or worsening of symptoms that brought you to the emergency room today.    Continue to take your home medications as previously directed.    Please follow up with your primary care physician in 1-2 days or as soon as possible.  You should also follow up with your cardiologist as soon as possible.

## 2021-12-20 NOTE — ED CDU PROVIDER DISPOSITION NOTE - CLINICAL COURSE
Julia DENG- 58 Y/O M with h/o htn p/w discoloration of wound over the right third finger pulp space with 6 wks old fx and was placed in cdu for iv antibiotic after hand consult, pt feels better and has no fever, chills. Pt has decrease range of motion at rt 3rd finger PIP and DIP but normal rom at mcp .Pt is rt handed and this was work related injury    pt is alert, well appearing male, s1s2 normal reg,  b/l clear breath sounds, abd soft, nt nd, normal bowel sounds, neuro exam aox3  cn 2-12 intact, no focal defictis, good pulses in all 4 ext, rt hand 3rd finger wrapped in gauze with dry skin changes around 3rd finger and 2nd and 4th finger but no redness, no drainage, Pt able to move all fingers at mcp , unable to flex at rt 3rd pip and dip     stable to send home with outpatient follow up, given strict wound instructions, and continue po antibiotics. Epidermal Closure Graft Donor Site (Optional): simple interrupted

## 2021-12-24 NOTE — H&P ADULT - NS MD HP PULSE POSTERIOR
Addended by: Magdi Mccullough on: 12/24/2021 11:49 AM     Modules accepted: Orders
right normal/left normal

## 2022-02-18 NOTE — DISCHARGE NOTE ADULT - DISCHARGE DATE
Information your provider wants you to know    Your opinion matters! Thank you for choosing Dr. Gael Castellano at [facility]. You might receive a survey in the mail about your experience today to evaluate our clinic. Please fill out and return it in the pre-paid envelope. It was a pleasure to care for you today!    Please include comments and feedback to our office on how we can improve.       Clinic Policy:    If you are sick and need to be seen, please call the office at [phone] for an appointment.  If you are told there are no appointments available, please ask to speak to our nurse, and we will get you taken care of.    Cancellation and No Show:  If you must cancel a visit, please give 24 hours' notice so we can accommodate other patients waiting to be seen. As a courtesy, our automated system will place a reminder call to you 48 hours prior to your appointment time. Any appointment cancelled less than 2 hours prior to start time will be considered a “No Show”. You can cancel your appointment by calling our office at [phone] during normal business hours of 7:30 am to 5:00 pm or online via your [employer] account.     Office Hours:    Monday, Tuesday, Wednesday, Friday 7:30 am -5PM      Forms (FMLA/Disability):    Please complete your portion of any forms prior to bringing them into the office. This includes adding a telephone number where you can be reached during normal business hours. Please allow 7-14 days for any form to be completed. Some FMLA (Family and Medical Leave Act) and disability forms will need an appointment to be completed.     Medication Requests:    Please plan ahead. It can take up to 2 business days' notice for refills to be completed though we ask that you call one week prior to running out of medication. Please contact your preferred pharmacy for your refills. The pharmacy will contact the office for the refills.     Messages:    Messages left for Dr. Castellano will be returned  11-Dec-2018 within 24 business hours or sooner.     Test Results:    Our goal is to report all test results ordered by Dr. Castellano within 7-14 days. If you do not receive your test results either by phone, mail or [employer] message within 14 days after your visit with our office, please call our office at [phone] and ask to speak with a member of our care team or send your care team a message via your "[employer] account.        12-Dec-2018

## 2022-05-17 NOTE — CONSULT NOTE ADULT - PROVIDER SPECIALTY LIST ADULT
Cardiology
Neurology
asthma attack that started tonight; had been using Albuterol inhaler with no relief; denies fever, chills or cough

## 2022-10-16 NOTE — ED ADULT TRIAGE NOTE - CHIEF COMPLAINT QUOTE
PATIENT NAME:                     Shannon Lara  YOB: 1962   MEDICAL RECORD#         [id number]  SURGEON:                 Rhea Adames [facility] pt c/o hypertension, states he had a headaches at home which prompted him to take BP. BP at home 197/103. denies dizziness, vision changes. pt compliant with BP meds. PMH HLD, HTN, cardiac stent x1 2018 on plavix. Rhea Adames MD at [address] [facility] 

## 2023-03-07 NOTE — ED CDU PROVIDER INITIAL DAY NOTE - PROGRESS NOTE DETAILS
Low Dose Naltrexone Pregnancy And Lactation Text: Naltrexone is pregnancy category C.  There have been no adequate and well-controlled studies in pregnant women.  It should be used in pregnancy only if the potential benefit justifies the potential risk to the fetus.   Limited data indicates that naltrexone is minimally excreted into breastmilk. Patient presented for R finger abscess, seen by plastic surgery, pt is stable, infectious vs inflammatory etiology, finger is painful and opened s/p I and d, will give clinda with culture results pending.

## 2023-04-12 NOTE — PATIENT PROFILE ADULT - IS THERE A SUSPICION OF ABUSE/NEGLIGENCE?
[facility]     Received DM referral from GERALDO Escalera on 4/3    2nd call    Pt requests that we let her c/b to schedule initial visit.     Insurance: [place] [employer]age - has been seeing [facility] PCP  PCP: [facility]  Locations to be seen: [facility] at [phone], fax [phone]    Guillermo Zaman, WesleyD, BCACP  
no

## 2023-09-08 NOTE — H&P CARDIOLOGY - HISTORY OF PRESENT ILLNESS
58 year old man with past medical history of HTN, HLD, TARUN on CPAP sent to [facility] for cardiac catheterization. Patient has been experiencing palpitations and dizziness for about one week. He states that when he feels his heart racing he also starts to feel shaky. Pt denies any CP, SOB, HA, recent illness, cough, LE edema.  NST showed a small, fixed, mild intensity defect in the  mid inferior wall that thickens normally, consistent with attenuation artifact. Due to patients history and persistent symptoms patient is at high risk for CAD and referred for LHC. Mirvaso Counseling: Mirvaso is a topical medication which can decrease superficial blood flow where applied. Side effects are uncommon and include stinging, redness and allergic reactions.

## 2023-12-01 NOTE — ED PROVIDER NOTE - CONSTITUTIONAL, MLM
01-Dec-2023 15:35 normal... Well appearing, well nourished, awake, alert, oriented to person, place, time/situation and in no apparent distress.

## 2024-01-25 NOTE — ED ADULT NURSE NOTE - NSFALLRSKASSESASSIST_ED_ALL_ED
Controlled on amlodipine and HCTZ.   Continue to monitor with same treatment.   Diet and exercise recommended.   no

## 2024-07-26 NOTE — ED ADULT NURSE NOTE - OBJECTIVE STATEMENT
[facility] received pt in bed  A and Ox 3 in NAD, pt reports " my blood pressure was high all day today and I am having pain in my left chest since this morning, especially if I press here " pt reports pain in left lateral chest wall on pressure, describes midsternal pain as pressure, repots hx of recent stent placements, reports compliance with medications. lung sounds clear, pt placed on monitor IV initiated labs sent.

## 2024-07-26 NOTE — DISCHARGE NOTE ADULT - COMPLETE THE FOLLOWING IF THE PATIENT REFUSES THE INFLUENZA VACCINE:
Unable to answer due to medical condition/unresponsive/etc...
Risks/benefits discussed with patient/surrogate

## 2024-07-29 NOTE — ED ADULT NURSE NOTE - DOES PATIENT HAVE ADVANCE DIRECTIVE
History & Physical Examination    Patient Name: Bishop Mak  MRN: [id number]  CSN: [id number]  YOB: 1980    Diagnosis: Family history of colon cancer.     Present Illness: Family history of colon cancer.     Medications Prior to Admission   Medication Sig Dispense Refill Last Dose    loratadine 10 MG Oral Tab Take 1 tablet (10 mg total) by mouth daily.   may at [facility]    Albuterol Sulfate  (90 Base) MCG/ACT Inhalation Aero Soln TAKE 2 PUFFS BY MOUTH EVERY 6 HOURS AS NEEDED 8.5 Inhaler 0 years at [facility]    Meloxicam 15 MG Oral Tab Take 1 tablet (15 mg total) by mouth daily. (Patient not taking: Reported on 7/25/2024) 30 tablet 0 Not Taking     Current Facility-Administered Medications   Medication Dose Route Frequency    sodium chloride 0.9% 0.9% flush injection 10 mL  10 mL Intravenous PRN    lactated ringers infusion   Intravenous Continuous    midazolam (Versed) 2 MG/2ML injection 1 mg  1 mg Intravenous Q5 Min PRN    fentaNYL (Sublimaze) 50 mcg/mL injection 25 mcg  25 mcg Intravenous Q5 Min PRN       Allergies:   Allergies   Allergen Reactions    Cefaclor RASH       Past Medical History:    Shoulder impingement    left, s/p PT     Past Surgical History:   Procedure Laterality Date    Colonoscopy  2005    polyps    Colonoscopy N/A 12/16/2016    Procedure: COLONOSCOPY, POSSIBLE BIOPSY, POSSIBLE POLYPECTOMY 65051;  Surgeon: Boris Jones MD;  [facility]    Cyst removal      behind ear    Hernia surgery       Family History   Problem Relation Age of Onset    Cancer Mother 53        ovarian    Asthma Maternal Grandmother     Diabetes Paternal Grandfather      Social History     Tobacco Use    Smoking status: Never    Smokeless tobacco: Never   Substance Use Topics    Alcohol use: Yes     Alcohol/week: 0.0 standard drinks of alcohol     Comment: beer - occasionally       SYSTEM Check if Review is Normal Check if Physical Exam is Normal If not normal, please explain:   HEENT [ ] [ ]     NECK & BACK [ ] [ ]    HEART [x ] [x ]    LUNGS [ x] [ x]    ABDOMEN [ ]x [ ]x    UROGENITAL [ ] [ ]    EXTREMITIES [ ] [ ]    OTHER        [ x ] I have discussed the risks and benefits and alternatives with the patient/family.  They understand and agree to proceed with plan of care.  [ x ] I have reviewed the History and Physical done within the last 30 days.  Any changes noted above.    Boris Jones MD  7/29/2024  10:19 AM       No

## 2024-11-21 NOTE — ED PROVIDER NOTE - TEMPLATE, MLM
Can you call mom/patient and tell them I forgot to tell her that Lisandra needs to repeat her liver levels and cholesterol levels now that we are approaching the end of her treatment.  Please have her get them ASAP.  They do not need to be fasting.  Thank you!  Cardiac

## 2025-01-03 ENCOUNTER — INPATIENT (INPATIENT)
Facility: HOSPITAL | Age: 65
LOS: 0 days | Discharge: HOME | DRG: 303 | End: 2025-01-03
Attending: INTERNAL MEDICINE | Admitting: INTERNAL MEDICINE
Payer: COMMERCIAL

## 2025-01-03 VITALS
HEART RATE: 62 BPM | OXYGEN SATURATION: 100 % | DIASTOLIC BLOOD PRESSURE: 79 MMHG | TEMPERATURE: 98 F | SYSTOLIC BLOOD PRESSURE: 144 MMHG | RESPIRATION RATE: 18 BRPM

## 2025-01-03 VITALS
HEART RATE: 71 BPM | SYSTOLIC BLOOD PRESSURE: 187 MMHG | TEMPERATURE: 98 F | RESPIRATION RATE: 18 BRPM | OXYGEN SATURATION: 100 % | HEIGHT: 68 IN | WEIGHT: 203.05 LBS | DIASTOLIC BLOOD PRESSURE: 85 MMHG

## 2025-01-03 DIAGNOSIS — I10 ESSENTIAL (PRIMARY) HYPERTENSION: ICD-10-CM

## 2025-01-03 DIAGNOSIS — R07.89 OTHER CHEST PAIN: ICD-10-CM

## 2025-01-03 DIAGNOSIS — E11.9 TYPE 2 DIABETES MELLITUS WITHOUT COMPLICATIONS: ICD-10-CM

## 2025-01-03 DIAGNOSIS — Z29.9 ENCOUNTER FOR PROPHYLACTIC MEASURES, UNSPECIFIED: ICD-10-CM

## 2025-01-03 DIAGNOSIS — E78.5 HYPERLIPIDEMIA, UNSPECIFIED: ICD-10-CM

## 2025-01-03 DIAGNOSIS — I24.9 ACUTE ISCHEMIC HEART DISEASE, UNSPECIFIED: ICD-10-CM

## 2025-01-03 DIAGNOSIS — G47.33 OBSTRUCTIVE SLEEP APNEA (ADULT) (PEDIATRIC): ICD-10-CM

## 2025-01-03 DIAGNOSIS — Z95.5 PRESENCE OF CORONARY ANGIOPLASTY IMPLANT AND GRAFT: Chronic | ICD-10-CM

## 2025-01-03 DIAGNOSIS — I25.10 ATHEROSCLEROTIC HEART DISEASE OF NATIVE CORONARY ARTERY WITHOUT ANGINA PECTORIS: ICD-10-CM

## 2025-01-03 LAB
ALBUMIN SERPL ELPH-MCNC: 3.2 G/DL — LOW (ref 3.5–5)
ALP SERPL-CCNC: 74 U/L — SIGNIFICANT CHANGE UP (ref 40–120)
ALT FLD-CCNC: 30 U/L DA — SIGNIFICANT CHANGE UP (ref 10–60)
ANION GAP SERPL CALC-SCNC: 2 MMOL/L — LOW (ref 5–17)
APTT BLD: 33.6 SEC — SIGNIFICANT CHANGE UP (ref 24.5–35.6)
AST SERPL-CCNC: 15 U/L — SIGNIFICANT CHANGE UP (ref 10–40)
BASOPHILS # BLD AUTO: 0.07 K/UL — SIGNIFICANT CHANGE UP (ref 0–0.2)
BASOPHILS NFR BLD AUTO: 1 % — SIGNIFICANT CHANGE UP (ref 0–2)
BILIRUB SERPL-MCNC: 0.4 MG/DL — SIGNIFICANT CHANGE UP (ref 0.2–1.2)
BUN SERPL-MCNC: 20 MG/DL — HIGH (ref 7–18)
CALCIUM SERPL-MCNC: 8.9 MG/DL — SIGNIFICANT CHANGE UP (ref 8.4–10.5)
CHLORIDE SERPL-SCNC: 110 MMOL/L — HIGH (ref 96–108)
CO2 SERPL-SCNC: 26 MMOL/L — SIGNIFICANT CHANGE UP (ref 22–31)
CREAT SERPL-MCNC: 1.06 MG/DL — SIGNIFICANT CHANGE UP (ref 0.5–1.3)
EGFR: 78 ML/MIN/1.73M2 — SIGNIFICANT CHANGE UP
EOSINOPHIL # BLD AUTO: 0.3 K/UL — SIGNIFICANT CHANGE UP (ref 0–0.5)
EOSINOPHIL NFR BLD AUTO: 4.1 % — SIGNIFICANT CHANGE UP (ref 0–6)
GLUCOSE SERPL-MCNC: 166 MG/DL — HIGH (ref 70–99)
HCT VFR BLD CALC: 42.8 % — SIGNIFICANT CHANGE UP (ref 39–50)
HGB BLD-MCNC: 14.6 G/DL — SIGNIFICANT CHANGE UP (ref 13–17)
IMM GRANULOCYTES NFR BLD AUTO: 0.3 % — SIGNIFICANT CHANGE UP (ref 0–0.9)
INR BLD: 0.92 RATIO — SIGNIFICANT CHANGE UP (ref 0.85–1.16)
LYMPHOCYTES # BLD AUTO: 2.18 K/UL — SIGNIFICANT CHANGE UP (ref 1–3.3)
LYMPHOCYTES # BLD AUTO: 30.1 % — SIGNIFICANT CHANGE UP (ref 13–44)
MAGNESIUM SERPL-MCNC: 1.8 MG/DL — SIGNIFICANT CHANGE UP (ref 1.6–2.6)
MCHC RBC-ENTMCNC: 30.7 PG — SIGNIFICANT CHANGE UP (ref 27–34)
MCHC RBC-ENTMCNC: 34.1 G/DL — SIGNIFICANT CHANGE UP (ref 32–36)
MCV RBC AUTO: 90.1 FL — SIGNIFICANT CHANGE UP (ref 80–100)
MONOCYTES # BLD AUTO: 0.49 K/UL — SIGNIFICANT CHANGE UP (ref 0–0.9)
MONOCYTES NFR BLD AUTO: 6.8 % — SIGNIFICANT CHANGE UP (ref 2–14)
NEUTROPHILS # BLD AUTO: 4.19 K/UL — SIGNIFICANT CHANGE UP (ref 1.8–7.4)
NEUTROPHILS NFR BLD AUTO: 57.7 % — SIGNIFICANT CHANGE UP (ref 43–77)
NRBC # BLD: 0 /100 WBCS — SIGNIFICANT CHANGE UP (ref 0–0)
NT-PROBNP SERPL-SCNC: 104 PG/ML — SIGNIFICANT CHANGE UP (ref 0–125)
PHOSPHATE SERPL-MCNC: 2.2 MG/DL — LOW (ref 2.5–4.5)
PLATELET # BLD AUTO: 186 K/UL — SIGNIFICANT CHANGE UP (ref 150–400)
POTASSIUM SERPL-MCNC: 4.4 MMOL/L — SIGNIFICANT CHANGE UP (ref 3.5–5.3)
POTASSIUM SERPL-SCNC: 4.4 MMOL/L — SIGNIFICANT CHANGE UP (ref 3.5–5.3)
PROT SERPL-MCNC: 7.2 G/DL — SIGNIFICANT CHANGE UP (ref 6–8.3)
PROTHROM AB SERPL-ACNC: 10.7 SEC — SIGNIFICANT CHANGE UP (ref 9.9–13.4)
RBC # BLD: 4.75 M/UL — SIGNIFICANT CHANGE UP (ref 4.2–5.8)
RBC # FLD: 12.3 % — SIGNIFICANT CHANGE UP (ref 10.3–14.5)
SODIUM SERPL-SCNC: 138 MMOL/L — SIGNIFICANT CHANGE UP (ref 135–145)
TROPONIN I, HIGH SENSITIVITY RESULT: 13.2 NG/L — SIGNIFICANT CHANGE UP
WBC # BLD: 7.25 K/UL — SIGNIFICANT CHANGE UP (ref 3.8–10.5)
WBC # FLD AUTO: 7.25 K/UL — SIGNIFICANT CHANGE UP (ref 3.8–10.5)

## 2025-01-03 PROCEDURE — 83880 ASSAY OF NATRIURETIC PEPTIDE: CPT

## 2025-01-03 PROCEDURE — 99285 EMERGENCY DEPT VISIT HI MDM: CPT

## 2025-01-03 PROCEDURE — C8929: CPT

## 2025-01-03 PROCEDURE — A9502: CPT

## 2025-01-03 PROCEDURE — 71045 X-RAY EXAM CHEST 1 VIEW: CPT | Mod: 26

## 2025-01-03 PROCEDURE — 71045 X-RAY EXAM CHEST 1 VIEW: CPT

## 2025-01-03 PROCEDURE — 93010 ELECTROCARDIOGRAM REPORT: CPT

## 2025-01-03 PROCEDURE — 78452 HT MUSCLE IMAGE SPECT MULT: CPT | Mod: MC

## 2025-01-03 PROCEDURE — 85610 PROTHROMBIN TIME: CPT

## 2025-01-03 PROCEDURE — 83735 ASSAY OF MAGNESIUM: CPT

## 2025-01-03 PROCEDURE — 85025 COMPLETE CBC W/AUTO DIFF WBC: CPT

## 2025-01-03 PROCEDURE — 93005 ELECTROCARDIOGRAM TRACING: CPT

## 2025-01-03 PROCEDURE — 84484 ASSAY OF TROPONIN QUANT: CPT

## 2025-01-03 PROCEDURE — 85730 THROMBOPLASTIN TIME PARTIAL: CPT

## 2025-01-03 PROCEDURE — 36415 COLL VENOUS BLD VENIPUNCTURE: CPT

## 2025-01-03 PROCEDURE — 93017 CV STRESS TEST TRACING ONLY: CPT

## 2025-01-03 PROCEDURE — 84100 ASSAY OF PHOSPHORUS: CPT

## 2025-01-03 PROCEDURE — 80053 COMPREHEN METABOLIC PANEL: CPT

## 2025-01-03 RX ORDER — ACETAMINOPHEN 80 MG/.8ML
650 SOLUTION/ DROPS ORAL EVERY 6 HOURS
Refills: 0 | Status: DISCONTINUED | OUTPATIENT
Start: 2025-01-03 | End: 2025-01-03

## 2025-01-03 RX ORDER — PANTOPRAZOLE 40 MG/1
40 TABLET, DELAYED RELEASE ORAL
Refills: 0 | Status: DISCONTINUED | OUTPATIENT
Start: 2025-01-03 | End: 2025-01-03

## 2025-01-03 RX ORDER — ASPIRIN 81 MG
81 TABLET, DELAYED RELEASE (ENTERIC COATED) ORAL DAILY
Refills: 0 | Status: DISCONTINUED | OUTPATIENT
Start: 2025-01-03 | End: 2025-01-03

## 2025-01-03 RX ORDER — ATORVASTATIN CALCIUM 40 MG/1
1 TABLET, FILM COATED ORAL
Qty: 90 | Refills: 0
Start: 2025-01-03 | End: 2025-04-02

## 2025-01-03 RX ORDER — LOSARTAN POTASSIUM 100 MG/1
1 TABLET, FILM COATED ORAL
Qty: 0 | Refills: 0 | DISCHARGE
Start: 2025-01-03

## 2025-01-03 RX ORDER — METOPROLOL TARTRATE 50 MG
50 TABLET ORAL
Refills: 0 | Status: DISCONTINUED | OUTPATIENT
Start: 2025-01-03 | End: 2025-01-03

## 2025-01-03 RX ORDER — ATORVASTATIN CALCIUM 40 MG/1
40 TABLET, FILM COATED ORAL AT BEDTIME
Refills: 0 | Status: DISCONTINUED | OUTPATIENT
Start: 2025-01-03 | End: 2025-01-03

## 2025-01-03 RX ORDER — ONDANSETRON 4 MG/1
4 TABLET ORAL EVERY 8 HOURS
Refills: 0 | Status: DISCONTINUED | OUTPATIENT
Start: 2025-01-03 | End: 2025-01-03

## 2025-01-03 RX ORDER — INSULIN LISPRO 100/ML
VIAL (ML) SUBCUTANEOUS
Refills: 0 | Status: DISCONTINUED | OUTPATIENT
Start: 2025-01-03 | End: 2025-01-03

## 2025-01-03 RX ORDER — MAG HYDROX/ALUMINUM HYD/SIMETH 200-200-20
30 SUSPENSION, ORAL (FINAL DOSE FORM) ORAL EVERY 4 HOURS
Refills: 0 | Status: DISCONTINUED | OUTPATIENT
Start: 2025-01-03 | End: 2025-01-03

## 2025-01-03 RX ORDER — INSULIN LISPRO 100/ML
VIAL (ML) SUBCUTANEOUS AT BEDTIME
Refills: 0 | Status: DISCONTINUED | OUTPATIENT
Start: 2025-01-03 | End: 2025-01-03

## 2025-01-03 RX ORDER — METFORMIN 850 MG/1
1 TABLET ORAL
Refills: 0 | DISCHARGE

## 2025-01-03 RX ORDER — LOSARTAN POTASSIUM 100 MG/1
50 TABLET, FILM COATED ORAL DAILY
Refills: 0 | Status: DISCONTINUED | OUTPATIENT
Start: 2025-01-03 | End: 2025-01-03

## 2025-01-03 RX ORDER — HEPARIN SODIUM 1000 [USP'U]/ML
5000 INJECTION, SOLUTION INTRAVENOUS; SUBCUTANEOUS EVERY 12 HOURS
Refills: 0 | Status: DISCONTINUED | OUTPATIENT
Start: 2025-01-03 | End: 2025-01-03

## 2025-01-03 RX ORDER — GINKGO BILOBA 40 MG
3 CAPSULE ORAL AT BEDTIME
Refills: 0 | Status: DISCONTINUED | OUTPATIENT
Start: 2025-01-03 | End: 2025-01-03

## 2025-01-03 NOTE — DISCHARGE NOTE PROVIDER - HOSPITAL COURSE
Patient is a 64M, pmhx of HTN, HLD, CAD with AARON [place] 202[address], TARUN on CPAP, DM, Gout. Patient presented to the ED after having chest pain and palpitations at his pcp's office. Patient was seen post cardiac stress test, he denies cardiac symptoms. VSS. trop and bnp wnl. EKG NSR no STT changes. Patient is being admitted for ACS work up.    Patient is a 64M, pmhx of HTN, HLD, CAD with AARON in 20218, TARUN on CPAP, DM, Gout. Patient presented to the ED after having chest pain and palpitations at his pcp's office. Patient was seen post cardiac stress test, he denies cardiac symptoms. VSS. trop and bnp wnl. EKG NSR no STT changes. Patient is being admitted for ACS work up. Patient underwent nuclear stress test showing no significant ischemic ST segment changes. LV ventricle is normal in function and normal in size. Post stress left ventricular EF 74%. Normal left ventricular regional wall motion.   Patient is able to ambulate and tolerate diet prior to discharge. Patient is stable for discharge per attending and is advised to follow up with PCP as outpatient.

## 2025-01-03 NOTE — ED ADULT NURSE NOTE - OBJECTIVE STATEMENT
Patient reported that  he feels  irregular heart beats for 2 weeks . Patient has chest discomfort, denies chest pain, denies SOB, denies dizziness. Patient reports no blood in the urine, no blood in the stool. On Aspirin 81 mg. EKG completed and placed on cardiac monitor box 4. NSR noted.

## 2025-01-03 NOTE — DISCHARGE NOTE PROVIDER - NSDCMRMEDTOKEN_GEN_ALL_CORE_FT
Aspir 81 oral delayed release tablet: 1 tab(s) orally once a day  atorvastatin 40 mg oral tablet: 1 tab(s) orally once a day (at bedtime)  Glucophage 500 mg oral tablet: 1 tab(s) orally once a day  losartan 50 mg oral tablet: 1 tab(s) orally once a day  metoprolol succinate 50 mg oral tablet, extended release: 1 tab(s) orally 2 times a day   pantoprazole 40 mg oral delayed release tablet: 1 tab(s) orally once a day (before a meal)   Aspir 81 oral delayed release tablet: 1 tab(s) orally once a day  atorvastatin 40 mg oral tablet: 1 tab(s) orally once a day (at bedtime)  Cozaar 50 mg oral tablet: 1 tab(s) orally once a day  Glucophage 500 mg oral tablet: 1 tab(s) orally once a day  metoprolol succinate 50 mg oral tablet, extended release: 1 tab(s) orally 2 times a day   pantoprazole 40 mg oral delayed release tablet: 1 tab(s) orally once a day (before a meal)

## 2025-01-03 NOTE — DISCHARGE NOTE NURSING/CASE MANAGEMENT/SOCIAL WORK - NSDCPEFALRISK_GEN_ALL_CORE
For information on Fall & Injury Prevention, visit: [email or web address] OR  [email or web address] OR  https://www.cdc.gov/steadi/patient.html

## 2025-01-03 NOTE — ED PROVIDER NOTE - NSICDXPASTMEDICALHX_GEN_ALL_CORE_FT
PAST MEDICAL HISTORY:  Coronary artery disease s/p AARON to LAD on 12/7/2018    HLD (hyperlipidemia)     HTN (hypertension)     Sleep apnea

## 2025-01-03 NOTE — DISCHARGE NOTE PROVIDER - CARE PROVIDER_API CALL
SARBJIT MIKE  [address] [place] [address]  Phone: [phone]  Fax: [phone]  Established Patient  Follow Up Time:     Rachid Garcia  Cardiology  [address] [place] [address]  Phone: [phone]  Fax: [phone]  Established Patient  Follow Up Time:

## 2025-01-03 NOTE — H&P ADULT - PROBLEM SELECTOR PLAN 1
p/w chest pain  ACS protocol - asa, statin, bb  EKG shows: NSR. no stt changes   Trop and BNP wnl  Tele Monitoring  f/u Echo  f/u nuclear stress test results:  BRADY score  HEART score

## 2025-01-03 NOTE — ED PROVIDER NOTE - CLINICAL SUMMARY MEDICAL DECISION MAKING FREE TEXT BOX
Hema: 64-year-old male with past medical history of hypertension, hyperlipidemia, CAD status post PCI presents with palpitations x 2 weeks.  Patient ports intermittent palpitations described as irregular heartbeat over the past 2 weeks associated with chest discomfort.  Denies any fevers, chest pain, shortness of breath, abdominal pain, vomiting, diarrhea, dysuria, numbness, focal weakness, rash.  Denies any chest pain with exertion.  Physical exam per above. EKG without ST-T changes. No evidence of a cardiac arrythmia such as Brugada, WPW, HOCM, long or short QT.  No evidence of pericarditis, myocarditis, pulmonary embolism,  pneumothorax, pneumonia, Zoster, or esophageal perforation.  Historically not abrupt in onset, tearing or ripping, pulses symmetric, no e/o aortic dissection. Will obtain labs, imaging, provide supportive treatment with dispo pending workup.

## 2025-01-03 NOTE — ED PROVIDER NOTE - OBJECTIVE STATEMENT
64-year-old male with past medical history of hypertension, hyperlipidemia, CAD status post PCI presents with palpitations x 2 weeks.  Patient ports intermittent palpitations described as irregular heartbeat over the past 2 weeks associated with chest discomfort.  Denies any fevers, chest pain, shortness of breath, abdominal pain, vomiting, diarrhea, dysuria, numbness, focal weakness, rash.  Denies any chest pain with exertion.  Denies any additional complaints.

## 2025-01-03 NOTE — H&P ADULT - ASSESSMENT
Patient is a 64M, pmhx of HTN, HLD, CAD with AARON in 202[address], TARUN on CPAP, DM, Gout. Patient presented to the ED after having chest pain and palpitations at his pcp's office. Patient was seen post cardiac stress test, he denies cardiac symptoms. VSS. trop and bnp wnl. EKG NSR no STT changes. Patient is being admitted for ACS work up.

## 2025-01-03 NOTE — DISCHARGE NOTE PROVIDER - PROVIDER TOKENS
PROVIDER:[TOKEN:[[id number]],ESTABLISHEDPATIENT:[T]],PROVIDER:[TOKEN:[[id number]],ESTABLISHEDPATIENT:[T]]

## 2025-01-03 NOTE — H&P ADULT - NSHPREVIEWOFSYSTEMS_GEN_ALL_CORE
T(C): 36.7 (01-03-25 @ 06:57), Max: 36.7 (01-03-25 @ 06:57)  HR: 62 (01-03-25 @ 07:40) (62 - 71)  BP: 187/85 (01-03-25 @ 06:57) (187/85 - 187/85)  RR: 18 (01-03-25 @ 06:57) (18 - 18)  SpO2: 100% (01-03-25 @ 06:57) (100% - 100%)    REVIEW OF SYSTEMS:  CONSTITUTIONAL: No weakness, fevers or chills  EYES/ENT: No visual changes;  No vertigo or throat pain   NECK: No pain or stiffness  RESPIRATORY: No cough, wheezing, hemoptysis; No shortness of breath  CARDIOVASCULAR: No chest pain or palpitations  GASTROINTESTINAL: No abdominal or epigastric pain. No nausea, vomiting, or hematemesis; No diarrhea or constipation. No melena or hematochezia.  GENITOURINARY: No dysuria, frequency or hematuria  NEUROLOGICAL: No numbness or weakness  SKIN: No itching, rashes

## 2025-01-03 NOTE — CHART NOTE - NSCHARTNOTEFT_GEN_A_CORE
Patient sent to ED Hx CAD s/p PCI-mLAD 12/2018 now with episodes palpitations and precordial discomfort  ECG in office Normal No ST T wave abnormalities  Please admit for ischemic work up        Rachid Garcia MD,FACC.  [address] [place] [address].  [phone]. [phone]  Availabe to call or text on [employer] TEAMS.

## 2025-01-03 NOTE — DISCHARGE NOTE NURSING/CASE MANAGEMENT/SOCIAL WORK - FINANCIAL ASSISTANCE
"Oncology Risk Management Consultation:  Date on this visit: 10/18/2018    Kezia \"Hazel\"  Elijah  is referred by Tiffany Albert, licensed genetic counselor, for an oncology risk management consultation. She requires evaluation for her risk of cancer secondary to having a family history of breast cancer in her sister at age 43, and maternal first cousin with triple negative breast cancer at age 41.  She also has a paternal aunt with a history of breast cancer.  She is considered to at high risk for breast cancer and has a 24.6% lifetime risk and a 2.1% 5-year risk for breast cancer by the KAROLINA model.     Primary Physician: Shamir Angeles MD    History Of Present Illness:  Ms. Nava is a very pleasant,  43 year old female who presents with family history of breast cancer.    Pertinent history:  Menarche at age 13.  Nulliparous.  Uterus, left ovary and fallopian tube in place.  History of right salpingo-oophorectomy removed for a dermoid cyst in 2006.  No  history of OCPs.  LMP: June 2018  HRT: 1-2 small doses of progesterone within the last 2 months.  S/P breast reduction surgery in 2003.  Heterogeneously dense breasts  No history of breast biopsy.  No history of atypia, or malignancy.  No history of tobacco use.  No extra environmental exposures.    Genetic testing:  None to date.  Kezia was planning to be tested with a Cancer Next panel through [employer] but has not yet decided whether to proceed with the test.    Pertinent screening history:  3/28/2005: Screening mammogram, category 1, normal.  7/23/2008, Screening mammogram, category 1, normal  3/2/2015:  right diagnostic mammogram and right breast ultrasound for palpable lump in right breast, both BI-RADS 2 tiny cysts.  2/13/2017: Screening mammogram, BI-RADS 2.  6/18/2018: Screening Tomosynthesis mammogram, BI-RADS 1.    At this visit, she denies new fatigue, breast pain, asymmetry, lumps, masses, thickening, nipple discharge and skin changes in her " breasts.    Past Medical/Surgical History:  Past Medical History:   Diagnosis Date     Diffuse cystic mastopathy      Elevated BP 12/15/2016     Mesenteric adenitis 4/14     Nephrolithiasis     s/p lithotripsy     Thyroiditis, acute 12/2/2014    transient hypothyroidism- resolved     Type 2 diabetes mellitus with hyperglycemia, without long-term current use of insulin (H) 2/6/2017     Past Surgical History:   Procedure Laterality Date     ARTHROSCOPY ANKLE, OPEN REPAIR TENDON, COMBINED  11/8/2012    Procedure: COMBINED ARTHROSCOPY ANKLE, OPEN REPAIR TENDON;  Ankle Arthroscopy Left Ankle, Open Ligament Repair Left Ankle, Peroneal Tendon Repair Left Ankle ;  Surgeon: Otf Ramos DPM;  Location: [facility] OR     C APPENDECTOMY  1984     COMBINED CYSTOSCOPY, INSERT STENT URETER(S)  8/6/2013    Procedure: COMBINED CYSTOSCOPY, INSERT STENT URETER(S);  cystoscopy, right retrograde,RIGHT STENT PLACEMENT.;  Surgeon: Kan Porter MD;  Location:  OR     CYSTOSCOPY, RETROGRADES, INSERT STENT URETER(S), COMBINED  8/6/2013    Procedure: COMBINED CYSTOSCOPY, RETROGRADES, INSERT STENT URETER(S);  cystoscopy, right retrograde, insert stent right ureter;  Surgeon: Kan Porter MD;  Location:  OR     DENTAL SURGERY      TOOTH#7 - DENTAL IMPLANT     DISCECTOMY, FUSION CERVICAL ANTERIOR ONE LEVEL, COMBINED N/A 12/13/2015    Procedure: COMBINED DISCECTOMY, FUSION CERVICAL ANTERIOR ONE LEVEL;  Surgeon: Seven Umaña MD;  Location:  OR     EXTRACORPOREAL SHOCK WAVE LITHOTRIPSY (ESWL)  8/19/2013    Procedure: EXTRACORPOREAL SHOCK WAVE LITHOTRIPSY (ESWL);   RIGHT EXTRACORPOREAL SHOCK WAVE LITHOTRIPSY;  Surgeon: Otf Tobias MD;  Location:  OR     HC REDUCTION OF LARGE BREAST Bilateral 2003     LASIK BILATERAL       SALPINGO OOPHORECTOMY,R/L/DERREK Right 05/17/2006    Right       Allergies:  Allergies as of 10/18/2018 - Gino as Reviewed 10/18/2018   Allergen Reaction Noted     Lisinopril  Cough 08/01/2017     No known drug allergies  06/23/2003     Pneumovax [pneumococcal polysaccharides] Other (See Comments) 07/13/2018       Current Medications:  Current Outpatient Prescriptions   Medication Sig Dispense Refill     Acetaminophen (TYLENOL PO) Take 500-1,000 mg by mouth every 6 hours as needed As needed for pain        aspirin (ASPIRIN ADULT LOW DOSE) 81 MG EC tablet Take 1 tablet (81 mg) by mouth daily 30 tablet 3     blood glucose monitoring (ACCU-CHEK ALMA PLUS) test strip Use to test blood sugar 1 times daily or as directed. 100 strip 1     blood glucose monitoring (ACCU-CHEK FASTCLIX) lancets Use to test blood sugar 1 times daily or as directed. 3 each 3     Ibuprofen (IBU PO) Take 600 mg by mouth every 6 hours as needed        insulin pen needle (BD VALENTINA U/F) 32G X 4 MM Use once daily as directed. 100 each PRN     liraglutide (VICTOZA PEN) 18 MG/3ML soln Inject 1.2 mg Subcutaneous daily 9 mL 3     loratadine (CLARITIN) 10 MG tablet Take 10 mg by mouth daily       losartan-hydrochlorothiazide (HYZAAR) 50-12.5 MG per tablet Take 1 tablet by mouth daily 90 tablet 1     metFORMIN (GLUCOPHAGE-XR) 500 MG 24 hr tablet Take 1 tablet (500 mg) by mouth daily (with dinner) 90 tablet 1     multivitamin, therapeutic with minerals (MULTI-VITAMIN) TABS tablet Take 1 tablet by mouth daily       ondansetron (ZOFRAN ODT) 4 MG ODT tab Take 1-2 tablets (4-8 mg) by mouth every 8 hours as needed for nausea 12 tablet 1     order for DME AIRSENSE 10  10-15 CM/H20  NASAL AIRFIT N10 FOR HER       STATIN NOT PRESCRIBED, INTENTIONAL, Please choose reason not prescribed, below       VITAMIN D, CHOLECALCIFEROL, PO Take 10,000 Units by mouth daily        zolpidem (AMBIEN CR) 6.25 MG CR tablet Take 1 tab by mouth at bedtime as needed. 90 tablet 0        Family History:  Family History   Problem Relation Age of Onset     C.A.D. Mother      stents     Hypertension Mother      KIDNEY DISEASE Mother      transplant      Hypertension Father      Breast Cancer Maternal Aunt      may have been fibrocystic     Breast Cancer Maternal Aunt      may have been fibrocystic     Cerebrovascular Disease Paternal Grandfather      Cancer Paternal Grandmother      stomach     Breast Cancer Sister 43     Nephrolithiasis Brother      Uterine Cancer Maternal Grandmother 44     Colon Cancer Maternal Grandmother 64     Prostate Cancer Maternal Uncle 50     Breast Cancer Cousin 41     maternal first cousin, triple negative breast cancer     Breast Cancer Paternal Aunt        Social History:  Social History     Social History     Marital status: Single     Spouse name: N/A     Number of children: 0     Years of education: N/A     Occupational History     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 Yes     Social History Narrative       Physical Exam:  /83 (BP Location: Right arm, Patient Position: Chair, Cuff Size: Adult Large)  Pulse 92  Temp 98.5  F (36.9  C) (Oral)  Resp 14  Wt 90.3 kg (199 lb)  SpO2 96%  BMI 36.4 kg/m2    GENERAL APPEARANCE: healthy, alert and mild distress     NECK: no adenopathy, no asymmetry or masses     LYMPHATICS: No cervical, supraclavicular or axillary lymphadenopathy     RESP: lungs clear to auscultation - no rales, rhonchi or wheezes     BREAST: A multipositional, bilateral breast exam was performed.  Fairly symmetrical -Lsl>R. Right breast: no palpable dominant masses, no nipple discharge, no skin changes.  Very dense tissue with multiple areas of fibrocystic changes throughout tissue.  Right axilla: no palpable adenopathy. Left breast: no palpable dominant  masses, no nipple discharge, no skin changes. Left axilla: no palpable adenopathy. Dense tissue with fibrocystic changes throughout.  Minimal faint scarring vertically in the posterior portion of  breasts bilaterally.  CARDIOVASCULAR: Event monitor in place.  Fast rate, regular rhythm, normal S1 S2, no S3 or S4 and no murmur.        SKIN: no suspicious lesions or rashes on anterior torso, upper extremities and face.    Laboratory/Imaging Studies  Results for orders placed or performed during the hospital encounter of 10/05/18   Cardiac Event Monitor - Peds/Adult    Narrative    [facility] RADIOLOGY - [facility] [[email or web address]] HEART IMAGING  [address]  [place] [address]  [phone]  10/5/2018      Patient:  Kezia Nava  Chart: [id number]  :  1974  Age:  43 year old  Sex:  female       Procedure:  Event Monitor Placed: Please see scanned document for result once interpretation is completed.        Technician performing hook-up:  Alejandra DILLARD  Hazel is not interested in genetic testing as this time because of the out-of-pocket expenses.  She is interested in minimizing her risk for breast cancer and we discussed the risks and benefits of tamoxifen at length.  She is going to consider whether she wants to start this and was given printed information about it.  We discussed the differences between prevention and surveillance.  We discussed signs and symptoms to be watchful for in between surveillance visits.  She verbalized understanding of signs and symptoms to address with her physicians including lumps, thickening, swelling, tenderness, nipple discharge or changes in skin of breasts.    We also discussed following  a healthy lifestyle plan recommended by both NCCN and the American Cancer Society that can reduce the risk of breast cancer:  1 Limit alcohol consumption to less than 1 drink per day (1 drink=5 oz.wine, 12 oz. Beer or 1.5 oz. 80-proof liquor).  She drinks a drink about  [facility] provides services at a reduced cost to those who are determined to be eligible through [facility]’s financial assistance program. Information regarding [facility]’s financial assistance program can be found by going to [email or web address] or by calling [phone]. once a month.  2. Exercise per American Cancer Society guidelines of at least 150 minutes of moderate-intensity activity or 75 minutes of vigorous activity each week. (Or a combination of both.) Exercise should be spread  out over the week. Regular recreational exercise has been shown to reduce the risk of cancer by 30%.  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  She has an aging pet that does not need much walking.  We discussed stress reduction in other ways but I would recommend that she exercise at least 30 minutes a day preferably outside to gain the benefit of both being in nature with stress reduction as well as the exercise.  3. Maintain a healthy weight with a Body Mass Index between 19-24.9. A postmenopausal BMI of 30.7 has been shown to have increased the risk for breast cancer by 37% in some studies.  Her current BMI is 36.  4. Do not use tobacco products and limit exposure to passive smoke.  She does not smoke.  5. Breastfeed if possible. Research shows that 16+ months of breastfeeding, over a lifetime, can reduce a woman's risk of breast cancer by up to 27%.      INDIVIDUALIZED CANCER RISK MANAGEMENT PLAN:    Individualized Surveillance Plan for women  With 20% or greater lifetime risk of breast cancer   Per NCCN Breast Cancer Screening and Diagnosis Guidelines Version 2.2018    Recommended screening Test or procedure Last done Next Scheduled    Clinical encounter Ongoing risk assessment, risk reduction counseling and clinical breast exam, every 6-12 months. Refer to genetic counseling if not already done.   10/18/2018   June 2019   However, some family histories with breast cancers at a very young age, may warrant screening starting earlier.    *May begin at age 40 if breast cancers in the family occur at later ages.    Annual mammogram beginning 10 years younger than the earliest breast cancer in the family but not prior to age 30.    Recommend annual breast MRI to begin 10  years younger than the earliest breast cancer in the family but not prior to age 25.    Breast MRIs are preferably done on day 7-15 of the menstrual cycle in premenopausal women. 6/18/2018 - Tomosynthesis mammogram, BiRads1    No history of breast MRI NEXT: Breast MRI in December    Next exam: June 2019 followed by tomosynthesis mammogram (no sooner than 6/19)   Breast screening for patients at high risk due to thoracic radiation before 30 years old    Begin 10 years post radiation treatment or age 25.   Annual mammogram beginning 10 years after radiation but not prior to age 30    Annual breast MRI, preferably on day 7-15 of menstrual cycle for premenopausal women.       NA     NA   Women who have a lifetime risk of >20% based on history of LCIS or ADH/ALH Annual screening mammogram beginning at age of LCIS or ADH/ALH but not prior to age 30.    Consider annual MRI to begin at age of diagnosis of LCIS or ADH/ALH but not prior to age 25.    Consider risk reducing strategies.   NA   NA    Recommend risk reducing strategies for women with 1.7% 5 year risk of breast cancer. 2.1%      I will be happy to work with her to manage her cancer risk, will follow up on her screenings and see her in the Cancer Risk Management clinic in six months.    I spent 40 minutes with the patient with greater that 50% of it in counseling and coordinating care as documented above.    SALLIE Fuller-CNS, OCN, AGN-BC  Clinical Nurse Specialist  Cancer Risk Management Program  [address]  [place] [address]    phone:  [phone]  Pager: [phone]  fax: [phone]    CC: Shamir Angeles MD

## 2025-01-03 NOTE — ED ADULT NURSE REASSESSMENT NOTE - NS ED NURSE REASSESS COMMENT FT1
Patient is being sent to Nuclear medicine for stress test, on zoladriano, Telebox 4 with tele nurse Eliud.

## 2025-01-03 NOTE — DISCHARGE NOTE PROVIDER - NSDCCPCAREPLAN_GEN_ALL_CORE_FT
PRINCIPAL DISCHARGE DIAGNOSIS  Diagnosis: CAD (coronary artery disease)  Assessment and Plan of Treatment: You have history of coronary artery disease which is a narrowing of the arteries of your heart caused by a buildup of plaque made of cholesterol. The narrowing decreased the amount of blood flow to your heart causing chest pain, shortness of breath, sweating.   You are taking ASA, Metoprolol as home medications.  You follow Dr. Garcia cardiologist you were seen by Dr. Garcia at his office. You desscribed having chest pain and palpitation hence you were recommended to come to the ED for a nuclear stress test. You presented to the ED with out complaints of chest pain, palpitations, difficulty breathing. EKG in the emergency department was not significant for decreased blood flow to the heart. Lab work (troponin and BNP) correlating to the heart were both normal. You unwent a nuclear stress test of the heart which did not show any signs of decreased blood flow to the heart.   Please continue to take your medications as prescribed.  Please follow with your PCP Dr. Bradshaw  and Cardiologist Dr. Garcia in a week.        SECONDARY DISCHARGE DIAGNOSES  Diagnosis: HTN (hypertension)  Assessment and Plan of Treatment: You have a history of Hypertension. Your blood pressure target is 120-140/80-90, maintain healthy lifestyle, low salt diet, avoid fatty food, weight loss, exercise regularly or stay active as tolerated 30 mins X 3 times per week.  Notify your doctor if you have any of the following symptoms:   (Dizziness, Lightheadedness, Blurry vision, Headache, Chest pain, Shortness of breath.)  Please continue taking your home medications and follow-up with your PCP in 1 week from discharge to adjust medications as needed.      Diagnosis: DM (diabetes mellitus)  Assessment and Plan of Treatment: You have a history of diabetes. You need to continue monitoring your blood sugar levels closely and maintain healthy lifestyle by eating healthy diabetic regimen, weight loss and exercise regularly as tolerated.  Please continue to take your medication metformin as prescribed.   Please follow up with your PCP within a week of discharge.

## 2025-01-03 NOTE — DISCHARGE NOTE NURSING/CASE MANAGEMENT/SOCIAL WORK - PATIENT PORTAL LINK FT
You can access the FollowMyHealth Patient Portal offered by [facility] by registering at the following website: [email or web address]. By joining [facility]’s FollowMyHealth portal, you will also be able to view your health information using other applications (apps) compatible with our system.

## 2025-01-03 NOTE — ED ADULT NURSE NOTE - NSFALLUNIVINTERV_ED_ALL_ED
Bed/Stretcher in lowest position, wheels locked, appropriate side rails in place/Call bell, personal items and telephone in reach/Instruct patient to call for assistance before getting out of bed/chair/stretcher/Non-slip footwear applied when patient is off stretcher/[place] to call system/Physically safe environment - no spills, clutter or unnecessary equipment/Purposeful proactive rounding/Room/bathroom lighting operational, light cord in reach

## 2025-01-03 NOTE — ED ADULT NURSE NOTE - BREATHING
spontaneous/unlabored soft/nontender/no distention/no masses palpable/bowel sounds normal Negative/Unknown

## 2025-01-03 NOTE — ED ADULT NURSE NOTE - HISTORY OF COVID-19 VACCINATION
Problem: Discharge Planning  Goal: Discharge to home or other facility with appropriate resources  Outcome: Progressing  Flowsheets (Taken 11/4/2024 0800)  Discharge to home or other facility with appropriate resources: Identify barriers to discharge with patient and caregiver  Note: Pt involved in discharge planning. Barriers to discharge discussed with patient. Discharge learning needs identified. Discuss with patient any additional needed resources and transportation plans. Case management following plan of care.     Problem: Safety - Adult  Goal: Free from fall injury  11/4/2024 1444 by Fartun Zayas, RN  Outcome: Progressing  Flowsheets (Taken 11/4/2024 1442)  Free From Fall Injury: Instruct family/caregiver on patient safety  Note: Fall precautions in place. Bed is in lowest position, wheels locked, bed alarm on, non skid socks on. Call light and bedside table within reach. Pt calls out appropriately. Pt is up x1 sba. Will continue to assess and monitor.      Problem: Skin/Tissue Integrity  Goal: Absence of new skin breakdown  Description: 1.  Monitor for areas of redness and/or skin breakdown  2.  Assess vascular access sites hourly  3.  Every 4-6 hours minimum:  Change oxygen saturation probe site  4.  Every 4-6 hours:  If on nasal continuous positive airway pressure, respiratory therapy assess nares and determine need for appliance change or resting period.  11/4/2024 1444 by Fartun Zayas, RN  Outcome: Progressing  Note: Turns and pillow support utilized to offload weight. No new skin breakdown noted.      Yes

## 2025-01-03 NOTE — H&P ADULT - HISTORY OF PRESENT ILLNESS
Patient is a 64M, pmhx of CAD s/p AARON (2018) to mid LAD, HTn, HLD, TARUN on CPAP, gout, DM, Patient was recommended to come to the ED for further ACS evaluation. He was complaining of chest pain and palpitations a few days ago which resolved. In his OP provider's office (Dr. Badillos) his ekg was normal, no st an t wave changes. Patient was recommended to come to the ED. He currently denies chest pain, palpitations, syncopal episodes, presyncopal episodes, swelling of lower extremities. Denies sick contacts, respiratory symptoms.

## 2025-05-07 ENCOUNTER — EMERGENCY (EMERGENCY)
Facility: HOSPITAL | Age: 65
LOS: 1 days | End: 2025-05-07
Attending: EMERGENCY MEDICINE | Admitting: EMERGENCY MEDICINE
Payer: COMMERCIAL

## 2025-05-07 VITALS
SYSTOLIC BLOOD PRESSURE: 204 MMHG | DIASTOLIC BLOOD PRESSURE: 109 MMHG | HEIGHT: 64 IN | OXYGEN SATURATION: 98 % | WEIGHT: 205.03 LBS | HEART RATE: 64 BPM | RESPIRATION RATE: 18 BRPM | TEMPERATURE: 98 F

## 2025-05-07 VITALS
OXYGEN SATURATION: 100 % | HEART RATE: 70 BPM | DIASTOLIC BLOOD PRESSURE: 74 MMHG | SYSTOLIC BLOOD PRESSURE: 135 MMHG | RESPIRATION RATE: 16 BRPM | TEMPERATURE: 98 F

## 2025-05-07 DIAGNOSIS — Z95.5 PRESENCE OF CORONARY ANGIOPLASTY IMPLANT AND GRAFT: Chronic | ICD-10-CM

## 2025-05-07 LAB
ALBUMIN SERPL ELPH-MCNC: 3.8 G/DL — SIGNIFICANT CHANGE UP (ref 3.3–5)
ALP SERPL-CCNC: 88 U/L — SIGNIFICANT CHANGE UP (ref 40–120)
ALT FLD-CCNC: 23 U/L — SIGNIFICANT CHANGE UP (ref 4–41)
ANION GAP SERPL CALC-SCNC: 13 MMOL/L — SIGNIFICANT CHANGE UP (ref 7–14)
APTT BLD: 32.6 SEC — SIGNIFICANT CHANGE UP (ref 26.1–36.8)
AST SERPL-CCNC: 27 U/L — SIGNIFICANT CHANGE UP (ref 4–40)
BASOPHILS # BLD AUTO: 0.08 K/UL — SIGNIFICANT CHANGE UP (ref 0–0.2)
BASOPHILS NFR BLD AUTO: 1 % — SIGNIFICANT CHANGE UP (ref 0–2)
BILIRUB SERPL-MCNC: 0.5 MG/DL — SIGNIFICANT CHANGE UP (ref 0.2–1.2)
BUN SERPL-MCNC: 17 MG/DL — SIGNIFICANT CHANGE UP (ref 7–23)
CALCIUM SERPL-MCNC: 9.8 MG/DL — SIGNIFICANT CHANGE UP (ref 8.4–10.5)
CHLORIDE SERPL-SCNC: 101 MMOL/L — SIGNIFICANT CHANGE UP (ref 98–107)
CO2 SERPL-SCNC: 20 MMOL/L — LOW (ref 22–31)
CREAT SERPL-MCNC: 1.02 MG/DL — SIGNIFICANT CHANGE UP (ref 0.5–1.3)
EGFR: 82 ML/MIN/1.73M2 — SIGNIFICANT CHANGE UP
EGFR: 82 ML/MIN/1.73M2 — SIGNIFICANT CHANGE UP
EOSINOPHIL # BLD AUTO: 0.3 K/UL — SIGNIFICANT CHANGE UP (ref 0–0.5)
EOSINOPHIL NFR BLD AUTO: 3.8 % — SIGNIFICANT CHANGE UP (ref 0–6)
GLUCOSE SERPL-MCNC: 129 MG/DL — HIGH (ref 70–99)
HCT VFR BLD CALC: 46.7 % — SIGNIFICANT CHANGE UP (ref 39–50)
HGB BLD-MCNC: 15.6 G/DL — SIGNIFICANT CHANGE UP (ref 13–17)
IANC: 4.34 K/UL — SIGNIFICANT CHANGE UP (ref 1.8–7.4)
IMM GRANULOCYTES NFR BLD AUTO: 0.3 % — SIGNIFICANT CHANGE UP (ref 0–0.9)
INR BLD: 0.93 RATIO — SIGNIFICANT CHANGE UP (ref 0.85–1.16)
LYMPHOCYTES # BLD AUTO: 2.49 K/UL — SIGNIFICANT CHANGE UP (ref 1–3.3)
LYMPHOCYTES # BLD AUTO: 31.7 % — SIGNIFICANT CHANGE UP (ref 13–44)
MCHC RBC-ENTMCNC: 30.2 PG — SIGNIFICANT CHANGE UP (ref 27–34)
MCHC RBC-ENTMCNC: 33.4 G/DL — SIGNIFICANT CHANGE UP (ref 32–36)
MCV RBC AUTO: 90.5 FL — SIGNIFICANT CHANGE UP (ref 80–100)
MONOCYTES # BLD AUTO: 0.62 K/UL — SIGNIFICANT CHANGE UP (ref 0–0.9)
MONOCYTES NFR BLD AUTO: 7.9 % — SIGNIFICANT CHANGE UP (ref 2–14)
NEUTROPHILS # BLD AUTO: 4.34 K/UL — SIGNIFICANT CHANGE UP (ref 1.8–7.4)
NEUTROPHILS NFR BLD AUTO: 55.3 % — SIGNIFICANT CHANGE UP (ref 43–77)
NRBC # BLD AUTO: 0.02 K/UL — HIGH (ref 0–0)
NRBC # FLD: 0.02 K/UL — HIGH (ref 0–0)
NRBC BLD AUTO-RTO: 0 /100 WBCS — SIGNIFICANT CHANGE UP (ref 0–0)
PLATELET # BLD AUTO: 210 K/UL — SIGNIFICANT CHANGE UP (ref 150–400)
POTASSIUM SERPL-MCNC: 4.9 MMOL/L — SIGNIFICANT CHANGE UP (ref 3.5–5.3)
POTASSIUM SERPL-SCNC: 4.9 MMOL/L — SIGNIFICANT CHANGE UP (ref 3.5–5.3)
PROT SERPL-MCNC: 7.3 G/DL — SIGNIFICANT CHANGE UP (ref 6–8.3)
PROTHROM AB SERPL-ACNC: 11.1 SEC — SIGNIFICANT CHANGE UP (ref 9.9–13.4)
RBC # BLD: 5.16 M/UL — SIGNIFICANT CHANGE UP (ref 4.2–5.8)
RBC # FLD: 12.6 % — SIGNIFICANT CHANGE UP (ref 10.3–14.5)
SODIUM SERPL-SCNC: 134 MMOL/L — LOW (ref 135–145)
TROPONIN T, HIGH SENSITIVITY RESULT: 10 NG/L — SIGNIFICANT CHANGE UP
TROPONIN T, HIGH SENSITIVITY RESULT: 9 NG/L — SIGNIFICANT CHANGE UP
TSH SERPL-MCNC: 1.84 UIU/ML — SIGNIFICANT CHANGE UP (ref 0.27–4.2)
WBC # BLD: 7.85 K/UL — SIGNIFICANT CHANGE UP (ref 3.8–10.5)
WBC # FLD AUTO: 7.85 K/UL — SIGNIFICANT CHANGE UP (ref 3.8–10.5)

## 2025-05-07 PROCEDURE — 71046 X-RAY EXAM CHEST 2 VIEWS: CPT | Mod: 26

## 2025-05-07 PROCEDURE — 93010 ELECTROCARDIOGRAM REPORT: CPT

## 2025-05-07 PROCEDURE — 99284 EMERGENCY DEPT VISIT MOD MDM: CPT

## 2025-05-07 NOTE — ED PROVIDER NOTE - PATIENT PORTAL LINK FT
You can access the FollowMyHealth Patient Portal offered by [facility] by registering at the following website: [email or web address]. By joining [employer]’s FollowMyHealth portal, you will also be able to view your health information using other applications (apps) compatible with our system.

## 2025-05-07 NOTE — ED PROVIDER NOTE - ATTENDING CONTRIBUTION TO CARE
I have personally seen and examined this patient and I have fully participated in their care.  I have reviewed all pertinent clinical information, including the history, physical exam, plan and PA student's note, and agree except as noted.

## 2025-05-07 NOTE — ED ADULT TRIAGE NOTE - CHIEF COMPLAINT QUOTE
c/o palpitations and near syncope episodes since yesterday reports feel like his heart is skipping beats. phx of DM type 2, CAD.

## 2025-05-07 NOTE — ED ADULT NURSE REASSESSMENT NOTE - NS ED NURSE REASSESS COMMENT FT1
Pt A&OX4. Breathing even and unlabored. Remains on cardiac monitor- NSR. 20 G IV to L AC intact. Full ROM. Reports no pain or discomfort at this time. Safety and comfort measures in place.
Break RN: pt remains a&ox4, c/o mild palpitations after eating lunch. PA Rafi aware. pt denies chest pain, sob, n+v, headache, dizziness. RR even, unlabored. Pending dispo.

## 2025-05-07 NOTE — ED ADULT NURSE NOTE - OBJECTIVE STATEMENT
Pt A&OX4. Ambulatory independently. Hx of DM2, HTN, CAD (stent placed in 2018)> Presents to the ED c/o palpitations and near syncope episodes since yesterday. Pt stated he feels like his heart is skipping beats. Pt reports yesterday approx. 10;00am he "saw blue for about 8 seconds and felt almost like I was unconscious." Pt reports no pain at this time but is concerned about that episode yesterday morning. Reports feeling "something" midsternal chest but does not know how to describe it. Denies chest pain, SOB, headache, leg pain. Heart sounds S1 and S2 heard upon auscultation. +2 pulses palpated in all extremities. Capillary refill less than 3 seconds. Breathing even and unlabored. Clear breath sounds. Neuro status intact + facial symmetry, PERRLA, strength equal in all extremities, sensory intact. Skin warm dry and intact. Color normal for race. No limb restrictions. No pedal edema. Normoactive bowel sounds heard in all four quadrants. No urinary symptoms. Placed on cardiac monitor- NSR. Safety and comfort measures in place. Waiting upon orders.

## 2025-05-07 NOTE — ED PROVIDER NOTE - NSFOLLOWUPINSTRUCTIONS_ED_ALL_ED_FT
Rest, drink plenty of fluids.  Advance activity as tolerated.  Continue all previously prescribed medications as directed.  Follow up with your primary care physician and cardiologist in 48-72 hours- bring copies of your results. Call on referral list given for next available appointment. Return to the ER for worsening or persistent symptoms, and/or ANY NEW OR CONCERNING SYMPTOMS. If you have issues obtaining follow up, please call: [phone] to obtain a doctor or specialist who takes your insurance in your area.

## 2025-05-07 NOTE — ED PROVIDER NOTE - PHYSICAL EXAMINATION
ATTENDING PHYSICAL EXAM  GEN - NAD; well appearing; A+O x3  HEAD - NC/AT; EYES/NOSE - PERRL, EOMI, mucous membranes moist, no discharge; THROAT: Oral cavity and pharynx normal. No inflammation, swelling, exudate, or lesions  NECK: Neck supple, non-tender without lymphadenopathy, no masses, no JVD  PULMONARY - CTA b/l, symmetric breath sounds, no w/r/r  CARDIAC -s1s2, RRR, no M,R,G  ABDOMEN - +NABS, ND, NT, soft, no guarding, no rebound, no masses   BACK - no CVA tenderness, No vertebral or paravertebral tenderness  EXTREMITIES - symmetric pulses, 2+ dp, capillary refill < 2 seconds, no clubbing, no cyanosis, no edema  SKIN - no rash or bruising   NEUROLOGIC - alert, CN 2-12 intact, no aphasia or dysarthria, sensation to light touch nl, coordination nl, finger to nose nl, romberg negative, motor 5/5 RUE/LUE/RLE/LLE/EHL/Plantar flexion, no pronator drift, no lateralizing features, gait nl

## 2025-05-07 NOTE — ED PROVIDER NOTE - NS ED ATTENDING STATEMENT MOD
I have seen and examined this patient and fully participated in the care of this patient as the teaching attending.  The service was shared with the REBECCA.  I reviewed and verified the documentation.

## 2025-05-07 NOTE — ED PROVIDER NOTE - WET READ LAUNCH FT
NURSE NOTES:

Received report from Hellen Espino RN. Rounds done. Patient alert, oriented. Dutch 
speaking. Family at bedside. Denies any pain, or other discomfort at this time. IV intact, 
patent RFA, will discontinue IVF after this liter per MD orders. Bed in low position, 
locked, side rails up x2, call light within reach. No distress noted, will continue to 
monitor. There are no Wet Read(s) to document.

## 2025-05-07 NOTE — ED ADULT TRIAGE NOTE - MEANS OF ARRIVAL
Visit Information Elisha Mcgee y [place] Personal Médico Departamento Teléfono del Dep. Número de visita 5/25/2017 10:30 AM Brenton Liang NP Stephen [phone] Follow-up Instructions Return if symptoms worsen or fail to improve. Upcoming Health Maintenance Date Due DTaP/Tdap/Td series (1 - Tdap) 4/24/2005 PAP AKA CERVICAL CYTOLOGY 4/24/2005 INFLUENZA AGE 9 TO ADULT 8/1/2017 Alergias  Review Complete El: 5/25/2017 Por: Brenton Liang NP  
 Wilfredo Hernández del:  5/25/2017 No Known Allergies Vacunas actuales [id number] el:  10/20/2016 Patricia Vargas Influenza Vaccine (Quad) PF 10/20/2016 No revisadas esta visita You Were Diagnosed With   
  
 [place] Rash and nonspecific skin eruption     ICD-10-CM: R21 
ICD-9-CM: 782.1 Tinea cruris     ICD-10-CM: B35.6 ICD-9-CM: 110.3 Partes vitales PS Pulso Temperatura Val[place] ( percentil de crecimiento) Peso (percentil de crecimiento) BMI (IMC)  
 105/76 (BP 1 Location: Right arm, BP Patient Position: Sitting) 84 98 °F (36.7 °C) (Oral) 4' 10.66\" (1.49 m) 124 lb (56.2 kg) 25.34 kg/m2 Estado obstétrico Estatus de tabaquísmo Injection Never Smoker Historial de signos vitales BMI and BSA Data Body Mass Index Body Surface Area  
 25.34 kg/m 2 1.53 m 2 Shae Neither Pharmacy Name Phone [facility] [address] [phone] Sheets lista de medicamentos actualizada Lista actualizada el: 5/25/17  1:03 PM.  Radha Million use sheets lista de medicamentos más reciente.  
  
  
  
  
 ketoconazole 2 % topical cream  
También conocido andie:  Jermaine Freiberg Apply  to affected area daily. loratadine 10 mg tablet También conocido andie:  Dolph Seals Take 1 Tab by mouth daily. Omeprazole delayed release 20 mg tablet También conocido andie:  PRILOSEC D/R Take 1 Tab by mouth two (2) times a day. [place] 1 [place] [employer] veces al anamaria por 10 sorensen  
  
 raNITIdine 150 mg tablet También conocido andie:  ZANTAC Take 1 Tab by mouth two (2) times a day. Recetas Enviado a la Joey Refills  
 ketoconazole (NIZORAL) 2 % topical cream 2 Sig: Apply  to affected area daily. Class: Normal  
 Pharmacy: [address] #: [phone] Route: Topical  
  
Instrucciones de seguimiento Return if symptoms worsen or fail to improve. Instrucciones para el Paciente Dermatitis: Instrucciones de cuidado - [ Dermatitis: Care Instructions ] Instrucciones de cuidado Dermatitis es el nombre general de cualquier salpullido o inflamación de la piel. Los distintos tipos de dermatitis causan diferentes tipos de salpullido. [address] [facility] causas comunes del salpullido se encuentran los medicamentos nuevos, las plantas (andie el zumaque venenoso o la hiedra venenosa), el calor y el estrés. Ciertas enfermedades también pueden causar un salpullido. Kati reacción alérgica a algo que toca la piel, andie el látex, el níquel o la hiedra venenosa, se llama dermatitis de contacto. La dermatitis de contacto también puede estar causada por algo que irrita la piel, andie la [place], kati sustancia química o el jabón. Nicole tipo de salpullido no se puede transmitir de [place] persona a otra. La duración del salpullido depende de goodwin causa. El salpullido puede durar unos días o meses. La atención de seguimiento es kati parte clave de goodwin tratamiento y seguridad. Asegúrese de hacer y acudir a todas las citas, y llame a goodwin médico si está teniendo problemas. También es kati buena idea saber los resultados de brady exámenes y mantener kati lista de los medicamentos que samuel. Cómo puede cuidarse en el hogar? · No se rasque el salpullido. Mantenga las uñas cortas y [place].  O use guantes si esto le ayuda a no rascarse. · Lávese la maninder solo con agua. Seque la maninder con toques suaves de toalla. · Colóquese paños húmedos y fríos sobre el salpullido para reducir la comezón. · Manténgase fresco y evite el sol. · Deje el salpullido en contacto con el aire tanto andie sea posible. · Si el salpullido le da comezón, use crema de hidrocortisona. Siga las instrucciones de la etiqueta. La loción de calamina podría ayudar en el raf del salpullido causado por plantas. · [place] un antihistamínico de venta [place], andie difenhidramina (Benadryl) o loratadina (Claritin), para aliviar la comezón. Deja y siga todas las indicaciones de la [place]. · Si goodwin médico le recetó kati crema, úsela según las indicaciones. Si goodwin médico le recetó un medicamento, tómelo exactamente según las indicaciones. Cuándo debe pedir ayuda? Llame a goodwin médico ahora mismo o busque atención médica inmediata si: · Tiene síntomas de infección, tales andie: ¨ Aumento del dolor, la hinchazón, la temperatura o el enrojecimiento. ¨ Vetas rojizas que salen de la maninder. ¨ Pus que sale de la maninder. [place] Course. · Tiene dolor en las articulaciones junto con el salpullido. Preste especial atención a los [facility], y asegúrese de comunicarse con goodwin médico si: 
· El salpullido está cambiando o empeorando. · No mejora andie se esperaba. Dónde puede encontrar más información en inglés? Rissa Deter a [email or web address].[email or web address] Devyn Puff D485 en la búsqueda para aprender más acerca de \"Dermatitis: Instrucciones de cuidado - [ Dermatitis: Care Instructions ]. \" 
Revisado: 13 octubre, 2016 Versión del contenido: 11.2 © 6212-7616 [employer]. Las instrucciones de cuidado fueron adaptadas bajo licencia por [employer] (which disclaims liability or warranty for this information).  Si usted tiene [place] S[place]ents afección médica o sobre estas instrucciones, siempre pregunte a goodwin profesional de eliazar. [employer] niega toda garantía o responsabilidad por goodwin uso de esta información. Introducing [facility] & HEALTH SERVICES! [facility] introduce portal paciente MyChart . Ahora se puede acceder a partes de goodwin expediente médico, enviar por correo electrónico la oficina de goodwin médico y solicitar renovaciones de medicamentos en línea. En goodwin navegador de Internet , Minh Jj a [email or web address] Giancarlo clic en el usuario por [place]? Sonya Bernardino clic aquí en la sesión Josette Mind[employer]. Verá la página de registro [place]. Ingrese goodwin código de [employer] monica y andie aparece a continuación. Usted no tendrá que [employer] código después de toshia completado el proceso de registro . Si usted no se inscribe antes de la fecha de caducidad , debe solicitar un nuevo código. · MyChart Código de acceso : [id number] Expires: 8/23/2017  1:03 PM 
 
Ingresa los últimos cuatro dígitos de goodwin Número de Seguro Social ( xxxx ) y fecha de nacimiento ( dd / mm / aaaa ) andie se indica y giancarlo clic en Enviar. Usted será llevado a la siguiente página de registro . Crear un ID MyChart . Esta será goodwin ID de inicio de sesión de MyChart y no puede ser [place] , por lo que pensar en kati que es Chris  y fácil de recordar . Crear kati contraseña MyChart . Usted puede cambiar goodwin contraseña en cualquier momento . Ingrese goodwin Password Reset de preguntas y [place] . Celeste se puede utilizar en un momento posterior si usted olvida goodwin contraseña. Introduzca goodwin dirección de correo electrónico . [facility] recibirá kati notificación por correo electrónico cuando la nueva información está disponible en MyChart . Tristin Russell clic en Registrarse. Nabila Fees fadumo y descargar porciones de goodwin expediente médico. 
Giancarlo sam en el enlace de descarga del menú Resumen para descargar kati copia portátil de goodwin información médica . Si tiene [employer] , por favor visite la sección de preguntas frecuentes del sitio web MyChart . Recuerde, MyChart NO es que se utilizará para las necesidades urgentes. Para emergencias médicas , llame al 911 . Ahora disponible en goodwin iPhone y Android ! Por favor proporcione rachel resumen de la documentación de cuidado a goodwin próximo proveedor. If you have any questions after today's visit, please call [phone]. ambulatory

## 2025-05-07 NOTE — ED PROVIDER NOTE - OBJECTIVE STATEMENT
64 year old male pmhx of htn, db, hld, CAD, stent (2018) presenting with complaint of blue vision (3-4 seconds) and palpitations that he felt yesterday at 10 am while working. He states that after the vision resolved he had no further issues and continued his work day. Pt states he then went to the Doctor and was told to come to the ED. PT states he has had this experience before while driving or watching tv but for only 1 second. Pt attests to having gone to the doctor for palpitations before but nothing was found. Pt further attests to strange epigastric sensation but denies fever, chills, sob, chest pain, weakness, n/v, headache, blurry vision, dizziness. 64 year old male pmhx of htn, db, hld, CAD, stent (2018) presenting with complaint of blue vision (3-4 seconds) and palpitations that he felt yesterday at 10 am while working. He states that after the vision resolved he had no further issues and continued his work day. Pt states he then went to the Doctor and was told to come to the ED. PT states he has had this experience before while driving or watching tv but for only 1 second. Pt attests to having gone to the doctor for palpitations before but nothing was found. Pt further attests to strange epigastric sensation but denies fever, chills, sob, chest pain, weakness, n/v, headache, blurry vision, dizziness.  Attending - Agree with above.  I evaluated patient myself. 63 y/o M c/o episodes of palpitations over past 4 days, often lasting a few seconds, occurring frequently including in ED now.  At approx 10am yesterday experienced typical episode of palpitations but also felt lightheaded "like I might pass out" and describes items in vision appeared blue.  This lasted 3-4 seconds then felt back to normal.  Denies any chest pain or shortness of breath.  No fall or trauma.  No headache, blurry vision, weakness, numbness, speech changes or diff ambulating.  Continued working and then went to PMD Dr. Bradshaw after work who suggested he be evaluated in ED.  Continued to have typical episodes of palps overnight without return of near-syncope.  Last saw cardiologist Dr. Garcia 3 months ago.  Previous [facility] records reviewed including NST and TTE studies on 1/3/25 showing no inducible ischemia, grade 1 LV diastolic dysfunction, EF 68%.  Reports he was having frequent palpitations for months which stopped when he discontinued taking metformin in January.  He started Jardiance 6 weeks ago.

## 2025-05-07 NOTE — ED PROVIDER NOTE - CLINICAL SUMMARY MEDICAL DECISION MAKING FREE TEXT BOX
64 year old male pmhx of htn, db, hld, CAD, stent (2018) presenting with complaint of blue vision (3-4 seconds) and palpitations that he felt yesterday at 10 am while working. He states that after the vision resolved he had no further issues and continued his work day. Pt states he then went to the Doctor and was told to come to the ED. PT states he has had this experience before while driving or watching tv but for only 1 second. Pt attests to having gone to the doctor for palpitations before in January but nothing was found. Pt further attests to strange epigastric sensation but denies fever, chills, sob, chest pain, weakness, n/v, headache, blurry vision, dizziness, LOC.  Physical Exam nonsignificant   R/o cardiac etiology  Labs, Trop, TSH, Chest X Ray, 64 year old male pmhx of htn, db, hld, CAD, stent (2018) presenting with complaint of blue vision (3-4 seconds) and palpitations that he felt yesterday at 10 am while working. He states that after the vision resolved he had no further issues and continued his work day. Pt states he then went to the Doctor and was told to come to the ED. PT states he has had this experience before while driving or watching tv but for only 1 second. Pt attests to having gone to the doctor for palpitations before in January but nothing was found. Pt further attests to strange epigastric sensation but denies fever, chills, sob, chest pain, weakness, n/v, headache, blurry vision, dizziness, LOC.  Physical Exam RRR, no m/r/g.  No neuro deficits.  History not c/w CNS etiology such as stroke.  Patient having palpitation episodes during ECG and on monitor without ventricular arrhythmia.    Labs, Trop, TSH, Chest X Ray,

## 2025-05-07 NOTE — ED PROVIDER NOTE - PROGRESS NOTE DETAILS
PA NICOLE: Patient reassessed, sitting comfortably in chair in NAD, denies any complaints. States feeling better, symptoms improved. trop 10 and 9. CXR clears. Discussed with attending, Dr. Ding, patient can be discharged home to f/u with PCP and cardiology.

## 2025-07-23 NOTE — DISCHARGE NOTE ADULT - NS AS DC PROVIDER CONTACT Y/N MULTI
Patient: Chinmay Colao    Procedure Summary       Date: 07/23/25 Room / Location: [facility] CATH LAB 2 (EP) / Virtual [facility] Cardiac Cath Lab    Anesthesia Start: 1427 Anesthesia Stop: 1552    Procedure: Ablation A-Fib PFA Diagnosis:       Atrial fibrillation with rapid ventricular response (Multi)      (AF)    Providers: Juvencio Bedolla MD Responsible Provider: Braden Lainez MD    Anesthesia Type: general ASA Status: 3            Anesthesia Type: general    Vitals Value Taken Time   /87 07/23/25 16:25   Temp 36 °C (96.8 °F) 07/23/25 15:50   Pulse 68 07/23/25 16:29   Resp 16 07/23/25 16:29   SpO2 98 % 07/23/25 16:29   Vitals shown include unfiled device data.    Anesthesia Post Evaluation    Patient location during evaluation: PACU  Patient participation: complete - patient participated  Level of consciousness: awake and alert  Pain management: adequate  Airway patency: patent  Cardiovascular status: acceptable  Respiratory status: acceptable  Hydration status: acceptable  Postoperative Nausea and Vomiting: none        There were no known notable events for this encounter.     Yes